# Patient Record
Sex: MALE | Race: WHITE | NOT HISPANIC OR LATINO | Employment: UNEMPLOYED | URBAN - METROPOLITAN AREA
[De-identification: names, ages, dates, MRNs, and addresses within clinical notes are randomized per-mention and may not be internally consistent; named-entity substitution may affect disease eponyms.]

---

## 2018-03-28 ENCOUNTER — CONVERSION ENCOUNTER (OUTPATIENT)
Dept: OTHER | Facility: HOSPITAL | Age: 48
End: 2018-03-28

## 2018-04-04 ENCOUNTER — CONVERSION ENCOUNTER (OUTPATIENT)
Dept: OTHER | Facility: HOSPITAL | Age: 48
End: 2018-04-04

## 2018-05-08 ENCOUNTER — HOSPITAL ENCOUNTER (OUTPATIENT)
Dept: FAMILY MEDICINE CLINIC | Facility: CLINIC | Age: 48
Setting detail: SPECIMEN
Discharge: HOME | End: 2018-05-08
Attending: FAMILY MEDICINE | Admitting: FAMILY MEDICINE

## 2018-05-08 ENCOUNTER — CONVERSION ENCOUNTER (OUTPATIENT)
Dept: OTHER | Facility: HOSPITAL | Age: 48
End: 2018-05-08

## 2018-05-08 LAB
ALBUMIN SERPL-MCNC: 4.3 G/DL (ref 3.5–4.8)
ALBUMIN/GLOB SERPL: 1.2 {RATIO} (ref 1–1.7)
ALP SERPL-CCNC: 77 IU/L (ref 32–91)
ALT SERPL-CCNC: 34 IU/L (ref 17–63)
ANION GAP SERPL CALC-SCNC: 16.7 MMOL/L (ref 10–20)
AST SERPL-CCNC: 30 IU/L (ref 15–41)
BASOPHILS # BLD AUTO: 0.1 10*3/UL (ref 0–0.2)
BASOPHILS NFR BLD AUTO: 1 % (ref 0–2)
BILIRUB SERPL-MCNC: 0.9 MG/DL (ref 0.3–1.2)
BUN SERPL-MCNC: 29 MG/DL (ref 8–20)
BUN/CREAT SERPL: 16.1 (ref 6.2–20.3)
CALCIUM SERPL-MCNC: 9.4 MG/DL (ref 8.9–10.3)
CHLORIDE SERPL-SCNC: 97 MMOL/L (ref 101–111)
CONV CO2: 24 MMOL/L (ref 22–32)
CONV TOTAL PROTEIN: 7.9 G/DL (ref 6.1–7.9)
CREAT UR-MCNC: 1.8 MG/DL (ref 0.7–1.2)
DIFFERENTIAL METHOD BLD: (no result)
EOSINOPHIL # BLD AUTO: 0.1 10*3/UL (ref 0–0.3)
EOSINOPHIL # BLD AUTO: 1 % (ref 0–3)
ERYTHROCYTE [DISTWIDTH] IN BLOOD BY AUTOMATED COUNT: 13.7 % (ref 11.5–14.5)
GLOBULIN UR ELPH-MCNC: 3.6 G/DL (ref 2.5–3.8)
GLUCOSE SERPL-MCNC: 215 MG/DL (ref 65–99)
HCT VFR BLD AUTO: 44.2 % (ref 40–54)
HGB BLD-MCNC: 14.9 G/DL (ref 14–18)
LYMPHOCYTES # BLD AUTO: 2.6 10*3/UL (ref 0.8–4.8)
LYMPHOCYTES NFR BLD AUTO: 25 % (ref 18–42)
MCH RBC QN AUTO: 29.2 PG (ref 26–32)
MCHC RBC AUTO-ENTMCNC: 33.8 G/DL (ref 32–36)
MCV RBC AUTO: 86.6 FL (ref 80–94)
MONOCYTES # BLD AUTO: 0.9 10*3/UL (ref 0.1–1.3)
MONOCYTES NFR BLD AUTO: 9 % (ref 2–11)
NEUTROPHILS # BLD AUTO: 6.8 10*3/UL (ref 2.3–8.6)
NEUTROPHILS NFR BLD AUTO: 64 % (ref 50–75)
NRBC BLD AUTO-RTO: 0 /100{WBCS}
NRBC/RBC NFR BLD MANUAL: 0 10*3/UL
PLATELET # BLD AUTO: 253 10*3/UL (ref 150–450)
PMV BLD AUTO: 10.5 FL (ref 7.4–10.4)
POTASSIUM SERPL-SCNC: 4.7 MMOL/L (ref 3.6–5.1)
RBC # BLD AUTO: 5.1 10*6/UL (ref 4.6–6)
SODIUM SERPL-SCNC: 133 MMOL/L (ref 136–144)
URATE SERPL-MCNC: 8.9 MG/DL (ref 4.8–8.7)
WBC # BLD AUTO: 10.5 10*3/UL (ref 4.5–11.5)

## 2018-06-06 ENCOUNTER — HOSPITAL ENCOUNTER (OUTPATIENT)
Dept: FAMILY MEDICINE CLINIC | Facility: CLINIC | Age: 48
Setting detail: SPECIMEN
Discharge: HOME | End: 2018-06-06
Attending: FAMILY MEDICINE | Admitting: FAMILY MEDICINE

## 2018-06-06 ENCOUNTER — CONVERSION ENCOUNTER (OUTPATIENT)
Dept: OTHER | Facility: HOSPITAL | Age: 48
End: 2018-06-06

## 2018-06-06 LAB
ANION GAP SERPL CALC-SCNC: 13.5 MMOL/L (ref 10–20)
BUN SERPL-MCNC: 30 MG/DL (ref 8–20)
BUN/CREAT SERPL: 16.7 (ref 6.2–20.3)
CALCIUM SERPL-MCNC: 9.4 MG/DL (ref 8.9–10.3)
CHLORIDE SERPL-SCNC: 103 MMOL/L (ref 101–111)
CONV CO2: 25 MMOL/L (ref 22–32)
CREAT UR-MCNC: 1.8 MG/DL (ref 0.7–1.2)
GLUCOSE SERPL-MCNC: 215 MG/DL (ref 65–99)
POTASSIUM SERPL-SCNC: 4.5 MMOL/L (ref 3.6–5.1)
SODIUM SERPL-SCNC: 137 MMOL/L (ref 136–144)
URATE SERPL-MCNC: 9.2 MG/DL (ref 4.8–8.7)

## 2018-08-07 ENCOUNTER — HOSPITAL ENCOUNTER (OUTPATIENT)
Dept: FAMILY MEDICINE CLINIC | Facility: CLINIC | Age: 48
Setting detail: SPECIMEN
Discharge: HOME | End: 2018-08-07
Attending: FAMILY MEDICINE | Admitting: FAMILY MEDICINE

## 2018-08-07 ENCOUNTER — CONVERSION ENCOUNTER (OUTPATIENT)
Dept: OTHER | Facility: HOSPITAL | Age: 48
End: 2018-08-07

## 2018-08-07 LAB
ANION GAP SERPL CALC-SCNC: 13.1 MMOL/L (ref 10–20)
BUN SERPL-MCNC: 29 MG/DL (ref 8–20)
BUN/CREAT SERPL: 16.1 (ref 6.2–20.3)
CALCIUM SERPL-MCNC: 9.3 MG/DL (ref 8.9–10.3)
CHLORIDE SERPL-SCNC: 103 MMOL/L (ref 101–111)
CONV CO2: 23 MMOL/L (ref 22–32)
CREAT UR-MCNC: 1.8 MG/DL (ref 0.7–1.2)
GLUCOSE SERPL-MCNC: 158 MG/DL (ref 65–99)
POTASSIUM SERPL-SCNC: 4.1 MMOL/L (ref 3.6–5.1)
SODIUM SERPL-SCNC: 135 MMOL/L (ref 136–144)
URATE SERPL-MCNC: 9.5 MG/DL (ref 4.8–8.7)

## 2018-11-06 ENCOUNTER — CONVERSION ENCOUNTER (OUTPATIENT)
Dept: OTHER | Facility: HOSPITAL | Age: 48
End: 2018-11-06

## 2018-11-06 ENCOUNTER — HOSPITAL ENCOUNTER (OUTPATIENT)
Dept: FAMILY MEDICINE CLINIC | Facility: CLINIC | Age: 48
Setting detail: SPECIMEN
Discharge: HOME | End: 2018-11-06
Attending: FAMILY MEDICINE | Admitting: FAMILY MEDICINE

## 2018-11-06 LAB
ALBUMIN SERPL-MCNC: 4.5 G/DL (ref 3.5–4.8)
ALBUMIN/GLOB SERPL: 1.3 {RATIO} (ref 1–1.7)
ALP SERPL-CCNC: 65 IU/L (ref 32–91)
ALT SERPL-CCNC: 52 IU/L (ref 17–63)
ANION GAP SERPL CALC-SCNC: 17.5 MMOL/L (ref 10–20)
AST SERPL-CCNC: 49 IU/L (ref 15–41)
BASOPHILS # BLD AUTO: 0.1 10*3/UL (ref 0–0.2)
BASOPHILS NFR BLD AUTO: 1 % (ref 0–2)
BILIRUB SERPL-MCNC: 1.2 MG/DL (ref 0.3–1.2)
BUN SERPL-MCNC: 24 MG/DL (ref 8–20)
BUN/CREAT SERPL: 15 (ref 6.2–20.3)
CALCIUM SERPL-MCNC: 9.4 MG/DL (ref 8.9–10.3)
CHLORIDE SERPL-SCNC: 103 MMOL/L (ref 101–111)
CHOLEST SERPL-MCNC: 187 MG/DL
CHOLEST/HDLC SERPL: 5.5 {RATIO}
CONV CO2: 23 MMOL/L (ref 22–32)
CONV LDL CHOLESTEROL DIRECT: 76 MG/DL (ref 0–100)
CONV TOTAL PROTEIN: 8 G/DL (ref 6.1–7.9)
CREAT UR-MCNC: 1.6 MG/DL (ref 0.7–1.2)
DIFFERENTIAL METHOD BLD: (no result)
EOSINOPHIL # BLD AUTO: 0.2 10*3/UL (ref 0–0.3)
EOSINOPHIL # BLD AUTO: 2 % (ref 0–3)
ERYTHROCYTE [DISTWIDTH] IN BLOOD BY AUTOMATED COUNT: 13.4 % (ref 11.5–14.5)
GLOBULIN UR ELPH-MCNC: 3.5 G/DL (ref 2.5–3.8)
GLUCOSE SERPL-MCNC: 133 MG/DL (ref 65–99)
HCT VFR BLD AUTO: 44.7 % (ref 40–54)
HDLC SERPL-MCNC: 34 MG/DL
HGB BLD-MCNC: 15.4 G/DL (ref 14–18)
LDLC/HDLC SERPL: 2.2 {RATIO}
LIPID INTERPRETATION: ABNORMAL
LYMPHOCYTES # BLD AUTO: 2.5 10*3/UL (ref 0.8–4.8)
LYMPHOCYTES NFR BLD AUTO: 35 % (ref 18–42)
MCH RBC QN AUTO: 29.9 PG (ref 26–32)
MCHC RBC AUTO-ENTMCNC: 34.4 G/DL (ref 32–36)
MCV RBC AUTO: 86.8 FL (ref 80–94)
MONOCYTES # BLD AUTO: 0.8 10*3/UL (ref 0.1–1.3)
MONOCYTES NFR BLD AUTO: 12 % (ref 2–11)
NEUTROPHILS # BLD AUTO: 3.5 10*3/UL (ref 2.3–8.6)
NEUTROPHILS NFR BLD AUTO: 50 % (ref 50–75)
NRBC BLD AUTO-RTO: 0 /100{WBCS}
NRBC/RBC NFR BLD MANUAL: 0 10*3/UL
PLATELET # BLD AUTO: 205 10*3/UL (ref 150–450)
PMV BLD AUTO: 10.5 FL (ref 7.4–10.4)
POTASSIUM SERPL-SCNC: 4.5 MMOL/L (ref 3.6–5.1)
RBC # BLD AUTO: 5.15 10*6/UL (ref 4.6–6)
SODIUM SERPL-SCNC: 139 MMOL/L (ref 136–144)
TRIGL SERPL-MCNC: 433 MG/DL
URATE SERPL-MCNC: 9.3 MG/DL (ref 4.8–8.7)
VLDLC SERPL CALC-MCNC: 77.1 MG/DL
WBC # BLD AUTO: 7 10*3/UL (ref 4.5–11.5)

## 2018-11-30 ENCOUNTER — CONVERSION ENCOUNTER (OUTPATIENT)
Dept: OTHER | Facility: HOSPITAL | Age: 48
End: 2018-11-30

## 2018-12-05 ENCOUNTER — CONVERSION ENCOUNTER (OUTPATIENT)
Dept: OTHER | Facility: HOSPITAL | Age: 48
End: 2018-12-05

## 2018-12-05 ENCOUNTER — HOSPITAL ENCOUNTER (OUTPATIENT)
Dept: FAMILY MEDICINE CLINIC | Facility: CLINIC | Age: 48
Setting detail: SPECIMEN
Discharge: HOME | End: 2018-12-05
Attending: FAMILY MEDICINE | Admitting: FAMILY MEDICINE

## 2018-12-05 LAB — URATE SERPL-MCNC: 8.4 MG/DL (ref 4.8–8.7)

## 2019-02-06 ENCOUNTER — CONVERSION ENCOUNTER (OUTPATIENT)
Dept: OTHER | Facility: HOSPITAL | Age: 49
End: 2019-02-06

## 2019-02-06 ENCOUNTER — HOSPITAL ENCOUNTER (OUTPATIENT)
Dept: FAMILY MEDICINE CLINIC | Facility: CLINIC | Age: 49
Setting detail: SPECIMEN
Discharge: HOME | End: 2019-02-06
Attending: FAMILY MEDICINE | Admitting: FAMILY MEDICINE

## 2019-02-06 LAB
ALBUMIN SERPL-MCNC: 4.1 G/DL (ref 3.5–4.8)
ALBUMIN/GLOB SERPL: 1.2 {RATIO} (ref 1–1.7)
ALP SERPL-CCNC: 81 IU/L (ref 32–91)
ALT SERPL-CCNC: 69 IU/L (ref 17–63)
ANION GAP SERPL CALC-SCNC: 15 MMOL/L (ref 10–20)
AST SERPL-CCNC: 61 IU/L (ref 15–41)
BILIRUB SERPL-MCNC: 0.9 MG/DL (ref 0.3–1.2)
BUN SERPL-MCNC: 22 MG/DL (ref 8–20)
BUN/CREAT SERPL: 12.9 (ref 6.2–20.3)
CALCIUM SERPL-MCNC: 9.2 MG/DL (ref 8.9–10.3)
CHLORIDE SERPL-SCNC: 101 MMOL/L (ref 101–111)
CONV CO2: 23 MMOL/L (ref 22–32)
CONV TOTAL PROTEIN: 7.6 G/DL (ref 6.1–7.9)
CREAT UR-MCNC: 1.7 MG/DL (ref 0.7–1.2)
GLOBULIN UR ELPH-MCNC: 3.5 G/DL (ref 2.5–3.8)
GLUCOSE SERPL-MCNC: 208 MG/DL (ref 65–99)
POTASSIUM SERPL-SCNC: 4 MMOL/L (ref 3.6–5.1)
SODIUM SERPL-SCNC: 135 MMOL/L (ref 136–144)
URATE SERPL-MCNC: 9.1 MG/DL (ref 4.8–8.7)

## 2019-03-20 ENCOUNTER — CONVERSION ENCOUNTER (OUTPATIENT)
Dept: OTHER | Facility: HOSPITAL | Age: 49
End: 2019-03-20

## 2019-03-27 ENCOUNTER — CONVERSION ENCOUNTER (OUTPATIENT)
Dept: OTHER | Facility: HOSPITAL | Age: 49
End: 2019-03-27

## 2019-04-24 ENCOUNTER — CONVERSION ENCOUNTER (OUTPATIENT)
Dept: OTHER | Facility: HOSPITAL | Age: 49
End: 2019-04-24

## 2019-05-21 ENCOUNTER — CONVERSION ENCOUNTER (OUTPATIENT)
Dept: OTHER | Facility: HOSPITAL | Age: 49
End: 2019-05-21

## 2019-06-04 VITALS
OXYGEN SATURATION: 99 % | BODY MASS INDEX: 40.84 KG/M2 | RESPIRATION RATE: 20 BRPM | HEART RATE: 72 BPM | OXYGEN SATURATION: 99 % | WEIGHT: 258.4 LBS | BODY MASS INDEX: 42.35 KG/M2 | RESPIRATION RATE: 22 BRPM | SYSTOLIC BLOOD PRESSURE: 153 MMHG | HEIGHT: 67 IN | HEIGHT: 67 IN | RESPIRATION RATE: 18 BRPM | OXYGEN SATURATION: 98 % | SYSTOLIC BLOOD PRESSURE: 129 MMHG | HEART RATE: 86 BPM | SYSTOLIC BLOOD PRESSURE: 138 MMHG | RESPIRATION RATE: 18 BRPM | WEIGHT: 254 LBS | DIASTOLIC BLOOD PRESSURE: 84 MMHG | OXYGEN SATURATION: 99 % | WEIGHT: 260.8 LBS | HEART RATE: 90 BPM | OXYGEN SATURATION: 98 % | HEIGHT: 67 IN | HEIGHT: 67 IN | BODY MASS INDEX: 39.71 KG/M2 | HEART RATE: 78 BPM | RESPIRATION RATE: 18 BRPM | HEART RATE: 83 BPM | OXYGEN SATURATION: 99 % | BODY MASS INDEX: 40.81 KG/M2 | DIASTOLIC BLOOD PRESSURE: 77 MMHG | BODY MASS INDEX: 40.87 KG/M2 | DIASTOLIC BLOOD PRESSURE: 83 MMHG | HEART RATE: 73 BPM | BODY MASS INDEX: 40.56 KG/M2 | BODY MASS INDEX: 41.03 KG/M2 | HEART RATE: 80 BPM | HEIGHT: 67 IN | SYSTOLIC BLOOD PRESSURE: 157 MMHG | DIASTOLIC BLOOD PRESSURE: 86 MMHG | RESPIRATION RATE: 18 BRPM | BODY MASS INDEX: 41.21 KG/M2 | RESPIRATION RATE: 20 BRPM | DIASTOLIC BLOOD PRESSURE: 87 MMHG | DIASTOLIC BLOOD PRESSURE: 88 MMHG | BODY MASS INDEX: 40.18 KG/M2 | RESPIRATION RATE: 20 BRPM | SYSTOLIC BLOOD PRESSURE: 153 MMHG | WEIGHT: 262.6 LBS | DIASTOLIC BLOOD PRESSURE: 83 MMHG | OXYGEN SATURATION: 99 % | OXYGEN SATURATION: 98 % | HEIGHT: 67 IN | BODY MASS INDEX: 39.87 KG/M2 | OXYGEN SATURATION: 97 % | HEIGHT: 67 IN | HEART RATE: 79 BPM | HEART RATE: 78 BPM | DIASTOLIC BLOOD PRESSURE: 75 MMHG | WEIGHT: 260 LBS | HEIGHT: 67 IN | WEIGHT: 259.4 LBS | WEIGHT: 260.4 LBS | WEIGHT: 261.4 LBS | DIASTOLIC BLOOD PRESSURE: 76 MMHG | HEIGHT: 67 IN | BODY MASS INDEX: 40.93 KG/M2 | SYSTOLIC BLOOD PRESSURE: 160 MMHG | HEIGHT: 67 IN | DIASTOLIC BLOOD PRESSURE: 96 MMHG | SYSTOLIC BLOOD PRESSURE: 163 MMHG | OXYGEN SATURATION: 99 % | HEIGHT: 67 IN | WEIGHT: 253 LBS | HEIGHT: 67 IN | OXYGEN SATURATION: 99 % | SYSTOLIC BLOOD PRESSURE: 144 MMHG | HEART RATE: 93 BPM | WEIGHT: 260.2 LBS | BODY MASS INDEX: 40.71 KG/M2 | WEIGHT: 269.8 LBS | OXYGEN SATURATION: 99 % | HEART RATE: 77 BPM | SYSTOLIC BLOOD PRESSURE: 132 MMHG | DIASTOLIC BLOOD PRESSURE: 78 MMHG | SYSTOLIC BLOOD PRESSURE: 146 MMHG | WEIGHT: 256 LBS | SYSTOLIC BLOOD PRESSURE: 146 MMHG | SYSTOLIC BLOOD PRESSURE: 140 MMHG | DIASTOLIC BLOOD PRESSURE: 84 MMHG

## 2019-06-04 VITALS
OXYGEN SATURATION: 97 % | HEART RATE: 84 BPM | BODY MASS INDEX: 39.99 KG/M2 | WEIGHT: 254.8 LBS | SYSTOLIC BLOOD PRESSURE: 140 MMHG | DIASTOLIC BLOOD PRESSURE: 83 MMHG | HEIGHT: 67 IN

## 2019-06-25 RX ORDER — INDAPAMIDE 2.5 MG/1
2.5 TABLET, FILM COATED ORAL DAILY
Qty: 90 TABLET | Refills: 0 | Status: SHIPPED | OUTPATIENT
Start: 2019-06-25 | End: 2019-08-27 | Stop reason: SINTOL

## 2019-06-25 RX ORDER — ALBUTEROL SULFATE 90 UG/1
2 AEROSOL, METERED RESPIRATORY (INHALATION) 3 TIMES DAILY PRN
Qty: 1 INHALER | Refills: 1 | Status: SHIPPED | OUTPATIENT
Start: 2019-06-25 | End: 2019-08-13 | Stop reason: SDUPTHER

## 2019-06-25 RX ORDER — COLCHICINE 0.6 MG/1
1 TABLET ORAL 2 TIMES DAILY
COMMUNITY
Start: 2018-05-09 | End: 2019-06-25 | Stop reason: SDUPTHER

## 2019-06-25 RX ORDER — AMLODIPINE BESYLATE 10 MG/1
10 TABLET ORAL DAILY
Qty: 90 TABLET | Refills: 0 | Status: SHIPPED | OUTPATIENT
Start: 2019-06-25 | End: 2019-12-23

## 2019-06-25 RX ORDER — COLCHICINE 0.6 MG/1
0.6 TABLET ORAL 2 TIMES DAILY
Qty: 180 TABLET | Refills: 0 | Status: SHIPPED | OUTPATIENT
Start: 2019-06-25 | End: 2019-08-20 | Stop reason: SINTOL

## 2019-06-25 RX ORDER — ASPIRIN 81 MG/1
1 TABLET ORAL DAILY
COMMUNITY
Start: 2015-01-15

## 2019-06-25 RX ORDER — HYDRALAZINE HYDROCHLORIDE 100 MG/1
100 TABLET, FILM COATED ORAL EVERY 8 HOURS
Qty: 270 TABLET | Refills: 0 | Status: SHIPPED | OUTPATIENT
Start: 2019-06-25 | End: 2020-03-11 | Stop reason: SDUPTHER

## 2019-06-25 RX ORDER — INDAPAMIDE 2.5 MG/1
1 TABLET, FILM COATED ORAL DAILY
COMMUNITY
Start: 2018-02-05 | End: 2019-06-25 | Stop reason: SDUPTHER

## 2019-06-25 RX ORDER — ROSUVASTATIN CALCIUM 10 MG/1
10 TABLET, COATED ORAL NIGHTLY
Qty: 90 TABLET | Refills: 0 | Status: SHIPPED | OUTPATIENT
Start: 2019-06-25 | End: 2020-04-28 | Stop reason: SDUPTHER

## 2019-06-25 RX ORDER — ALLOPURINOL 300 MG/1
300 TABLET ORAL DAILY
Qty: 90 TABLET | Refills: 0 | Status: SHIPPED | OUTPATIENT
Start: 2019-06-25 | End: 2019-07-26 | Stop reason: SDUPTHER

## 2019-06-25 RX ORDER — PEN NEEDLE, DIABETIC 30 GX3/16"
1 NEEDLE, DISPOSABLE MISCELLANEOUS 4 TIMES DAILY
COMMUNITY
Start: 2018-07-31 | End: 2019-06-25 | Stop reason: SDUPTHER

## 2019-06-25 RX ORDER — CALCIUM CARB/VITAMIN D3/VIT K1 500-100-40
1 TABLET,CHEWABLE ORAL DAILY
COMMUNITY
Start: 2018-04-09 | End: 2019-06-25 | Stop reason: SDUPTHER

## 2019-06-25 RX ORDER — AMLODIPINE BESYLATE 10 MG/1
1 TABLET ORAL DAILY
COMMUNITY
Start: 2018-06-23 | End: 2019-06-25 | Stop reason: SDUPTHER

## 2019-06-25 RX ORDER — PEN NEEDLE, DIABETIC 30 GX3/16"
1 NEEDLE, DISPOSABLE MISCELLANEOUS 4 TIMES DAILY
Qty: 100 EACH | Refills: 2 | Status: SHIPPED | OUTPATIENT
Start: 2019-06-25 | End: 2020-09-09 | Stop reason: SDUPTHER

## 2019-06-25 RX ORDER — HYDRALAZINE HYDROCHLORIDE 100 MG/1
1 TABLET, FILM COATED ORAL EVERY 8 HOURS
COMMUNITY
Start: 2018-07-02 | End: 2019-06-25 | Stop reason: SDUPTHER

## 2019-06-25 RX ORDER — CALCIUM CARB/VITAMIN D3/VIT K1 500-100-40
1 TABLET,CHEWABLE ORAL DAILY
Qty: 90 EACH | Refills: 0 | Status: SHIPPED | OUTPATIENT
Start: 2019-06-25

## 2019-06-25 RX ORDER — LOSARTAN POTASSIUM 100 MG/1
100 TABLET ORAL DAILY
Qty: 90 TABLET | Refills: 0 | Status: SHIPPED | OUTPATIENT
Start: 2019-06-25 | End: 2019-08-27 | Stop reason: SINTOL

## 2019-06-25 RX ORDER — ALBUTEROL SULFATE 90 UG/1
2 AEROSOL, METERED RESPIRATORY (INHALATION) 3 TIMES DAILY PRN
COMMUNITY
Start: 2019-05-21 | End: 2019-06-25 | Stop reason: SDUPTHER

## 2019-06-25 RX ORDER — CLONIDINE HYDROCHLORIDE 0.1 MG/1
0.1 TABLET ORAL EVERY 12 HOURS
Qty: 180 TABLET | Refills: 0 | Status: SHIPPED | OUTPATIENT
Start: 2019-06-25 | End: 2019-08-20 | Stop reason: SDUPTHER

## 2019-06-25 RX ORDER — ROSUVASTATIN CALCIUM 10 MG/1
1 TABLET, COATED ORAL NIGHTLY
COMMUNITY
Start: 2018-07-01 | End: 2019-06-25 | Stop reason: SDUPTHER

## 2019-06-25 RX ORDER — CLONIDINE HYDROCHLORIDE 0.1 MG/1
1 TABLET ORAL EVERY 12 HOURS
COMMUNITY
Start: 2019-03-27 | End: 2019-06-25 | Stop reason: SDUPTHER

## 2019-06-25 RX ORDER — ALLOPURINOL 300 MG/1
1 TABLET ORAL DAILY
COMMUNITY
Start: 2019-02-19 | End: 2019-06-25 | Stop reason: SDUPTHER

## 2019-06-25 RX ORDER — LOSARTAN POTASSIUM 100 MG/1
1 TABLET ORAL DAILY
COMMUNITY
Start: 2018-06-06 | End: 2019-06-25 | Stop reason: SDUPTHER

## 2019-06-28 NOTE — PROGRESS NOTES
Visit Type:  Acute Visit  Referring Provider:  Barbi Vega MD  Primary Provider:  Gary LEON, Barbi MCLEOD    CC:  head cold.    History of Present Illness:  Patient presents with head congestion and cough x 1 week, he had a sore throat the first few days.  He had a fever yesterday.  Pt is daibetic, on insulin  lives with elederly parents.  His mother is being treated for cancer      Vital Signs:    Patient Profile:    48 Years Old Male  Height:     67 inches (170.18 cm)  Weight:     254.8 pounds  BMI:        39.90     O2 Sat:     97 %  Temp:       98.4 degrees F oral  Pulse rate: 84 / minute  BP Sittin / 83  (left arm)    Cuff size:  regular          Vitals Entered By: Nayeli Enciso CMA (May 21, 2019 11:31 AM)    Active Medications:  BASAGLAR 100 UNIT/ML KWIKPEN (INSULIN GLARGINE) INJECT 35 UNITS UNDER THE SKIN EVERY DAY  CLONIDINE HCL 0.1 MG ORAL TABLET (CLONIDINE HCL) 1 po BID  OZEMPIC 0.25 OR 0.5 MG/DOSE SUBCUTANEOUS SOLUTION PEN-INJECTOR (SEMAGLUTIDE) 0.50 mg sq q week  TRADJENTA 5 MG TABLET (LINAGLIPTIN) TAKE 1 TABLET BY MOUTH EVERY DAY  ALLOPURINOL 300 MG ORAL TABLET (ALLOPURINOL) take 1 tablet daily  DICLOFENAC SODIUM 1 % TRANSDERMAL GEL (DICLOFENAC SODIUM) apply to ankle 3x a day  COLCHICINE 0.6 MG TABS (COLCHICINE) TAKE 1 TABLET BY MOUTH TWICE A DAY  LOSARTAN POTASSIUM 100 MG TAB (LOSARTAN POTASSIUM) TAKE ONE TABLET BY MOUTH DAILY  AMLODIPINE BESYLATE 10 MG TAB (AMLODIPINE BESYLATE) TAKE 1 TABLET BY MOUTH EVERY DAY  ROSUVASTATIN CALCIUM 10 MG TAB (ROSUVASTATIN CALCIUM) TAKE 1 TABLET BY MOUTH EVERY DAY  INDAPAMIDE 2.5 MG ORAL TABLET (INDAPAMIDE) TAKE 1 TABLET BY MOUTH DAILY  HYDRALAZINE 100 MG TABLET (HYDRALAZINE HCL) TAKE 1 TABLET BY MOUTH 3 TIMES A DAY  TIMOLOL MALEATE 0.5 % OPHTHALMIC SOLUTION (TIMOLOL MALEATE) INSTILL 1 DROP INTO AFFECTED EYE(S) EVERY MORNING  LATANOPROST 0.005 % OPHTHALMIC SOLUTION (LATANOPROST) INSTILL 1 DROP INTO BOTH EYES EVERY EVENING  ADULT ASPIRIN EC LOW STRENGTH 81 MG ORAL  "TABLET DELAYED RELEASE (ASPIRIN) Take 1 tablet by mouth daily  INSULIN SYRINGE 31G X 5/16\" 1 ML (INSULIN SYRINGE-NEEDLE U-100) Use 1 syringe qd with basaglar injection DX:E11.9  BLOOD PRESS MONITOR/M-L CUFF (BLOOD PRESSURE MONITORING) monitor bp daily & record  ULTICARE PEN NEEDLES 4MM 32G (INSULIN PEN NEEDLE) USE 1 PEN NEEDLE WITH EACH INSULIN INJECTION  DX:E11.9  TRUETRACK TEST IN VITRO STRIP (GLUCOSE BLOOD) Check blood sugar qd to BID  DX: E11.9    Current Allergies:  No known allergies      Past Medical History:     Reviewed history from 05/08/2018 and no changes required:        Diabetes, Type 2        Heart disease, hx MI        Hypertension        Gout        hearing loss        Hyperlipidemia                             Past Surgical History:     Reviewed history from 11/12/2015 and no changes required:        December 2014 [facility] for heart stent         Cataract Extraction  bilateral May 2015                Social History:     Reviewed history from 11/30/2018 and no changes required:        Passive Smoke: N        Alcohol Use: N        Drug Use: N        HIV/High Risk: N        Regular Exercise: Y        Hx Domestic Abuse: N        Restoration Affecting Care: N                Smoking History:        Patient has never smoked.        Risk Factors:     Smoked Tobacco Use:  Never smoker  Smokeless Tobacco Use:  Never  Passive smoke exposure:  no  Drug use:  no  HIV high-risk behavior:  no  Caffeine use:  2 drinks per day  Alcohol use:  no  Exercise:  yes     Times per week:  2     Type of Exercise:  walking  Seatbelt use:  100 %  Sun Exposure:  occasionally    Family History Risk Factors:     Family History of MI in females < 65 years old:  no     Family History of MI in males < 55 years old:  no    Dietary Counseling: yes    Previous Tobacco Use: Signed On - 04/24/2019  Smoked Tobacco Use:  Never smoker  Smokeless Tobacco Use:  Never  Passive smoke exposure:  no  Drug use:  no  HIV high-risk behavior:  " no  Caffeine use:  2 drinks per day    Previous Alcohol Use: Signed On - 04/24/2019  Alcohol use:  no  Exercise:  yes     Times per week:  2     Type of Exercise:  walking  Seatbelt use:  100 %  Sun Exposure:  occasionally    Family History Risk Factors:     Family History of MI in females < 65 years old:  no     Family History of MI in males < 55 years old:  no    Dietary Counseling: yes        Review of Systems        See HPI      Physical Exam    General:      obese.    Head:      Frontal Sinus Tenderness R.    Eyes:      PERRL/EOM intact, conjunctiva and sclera clear with out nystagmus.    Ears:      TM's intact and clear with normal canals with grossly normal hearing.    Nose:      turbinates swollen.    Mouth:      throat injected.    Neck:      no masses, thyromegaly, or abnormal cervical nodes.    Lungs:      decreased BS on L and wheezes on L.        Blood Pressure:  Today's BP: 140/83 mm Hg    Labwork:   Most Recent Lab Results:   LDL: 76 mg/dL 11/06/2018  HbA1c: : 8.7 % 04/04/2019  Microalbumin: 798.3 mg/L 02/09/2015      Impression & Recommendations:    Problem # 1:  Sore throat (ICD-462) (ATI41-T77.9)    His updated medication list for this problem includes:     Cefdinir 300 Mg Oral Capsule (Cefdinir) ..... Take 1 capsule 2x a day     Adult Aspirin Ec Low Strength 81 Mg Oral Tablet Delayed Release (Aspirin) ..... Take 1 tablet by mouth daily    Orders:  Rapid Strep  ([id number])      Problem # 2:  Persistent cough (ICD-786.2) ([id number])    Problem # 3:  Diabetes, Type 2 (ICD-250.00) (JYY53-B11.9)    His updated medication list for this problem includes:     Basaglar 100 Unit/ml Kwikpen (Insulin glargine) ..... Inject 35 units under the skin every day     Ozempic 0.25 or 0.5 Mg/dose Subcutaneous Solution Pen-injector (Semaglutide) ..... 0.50 mg sq q week     Tradjenta 5 Mg Tablet (Linagliptin) ..... Take 1 tablet by mouth every day     Losartan Potassium 100 Mg Tab (Losartan potassium) ..... Take one tablet  by mouth daily     Adult Aspirin Ec Low Strength 81 Mg Oral Tablet Delayed Release (Aspirin) ..... Take 1 tablet by mouth daily      Problem # 4:  Chronic kidney disease, Stage II (mild) (ICD-585.2) (IDR70-U03.2)    Medications Added to Medication List This Visit:  1)  Albuterol Sulfate Hfa 108 (90 Base) Mcg/act Inhalation Aerosol Solution (Albuterol sulfate) .... 2 puffs 3x a day as needed  2)  Tessalon Perles 100 Mg Oral Capsule (Benzonatate) .... Take 1 capsule 4x a day as needed for cough  3)  Cefdinir 300 Mg Oral Capsule (Cefdinir) .... Take 1 capsule 2x a day        Patient Instructions:  1)  fu cxr  2)  Rx cefdinir  3)  Rx tessalon  4)  keep fu appt    ]  Technician: Nayeli Enciso CMA               Date/Time Collected: May 21, 2019 12:01 PM)  Date/Time Received: May 21, 2019 12:01 PM)  Rapid Strep, Group A:    Presumptive Negative       Negative (normal range)        Electronically signed by Barbi Vega MD on 05/21/2019 at 12:10 PM  ________________________________________________________________________                   Allergies:   No Known Allergies        Labwork:   Most Recent Lab Results:   LDL: 76 mg/dL 11/06/2018  HbA1c: : 8.7 % 04/04/2019  Microalbumin: 798.3 mg/L 02/09/2015                              Medication Administration        Electronically signed by Barbi Vega MD on 06/27/2019 at 8:11 PM  ________________________________________________________________________       Disclaimer: Converted Note message may not contain all data elements that existed in the [employer] source system. Please see [employer] for the original note details.

## 2019-07-05 DIAGNOSIS — E13.9 DIABETES 1.5, MANAGED AS TYPE 2 (HCC): Primary | ICD-10-CM

## 2019-07-10 ENCOUNTER — OFFICE VISIT (OUTPATIENT)
Dept: FAMILY MEDICINE CLINIC | Facility: CLINIC | Age: 49
End: 2019-07-10

## 2019-07-10 VITALS
HEART RATE: 88 BPM | SYSTOLIC BLOOD PRESSURE: 163 MMHG | DIASTOLIC BLOOD PRESSURE: 91 MMHG | TEMPERATURE: 97.6 F | BODY MASS INDEX: 39.94 KG/M2 | OXYGEN SATURATION: 99 % | WEIGHT: 255 LBS | RESPIRATION RATE: 17 BRPM

## 2019-07-10 DIAGNOSIS — M25.511 ACUTE PAIN OF RIGHT SHOULDER: Primary | ICD-10-CM

## 2019-07-10 PROCEDURE — 99213 OFFICE O/P EST LOW 20 MIN: CPT | Performed by: FAMILY MEDICINE

## 2019-07-10 NOTE — PROGRESS NOTES
Subjective   Sam Prakash is a 48 y.o. male.     No problems updated.  History of Present Illness     Pt fell last week. He tripped on asphalt and landed on right shoulder.  Now c/o pain, limited movement.  The following portions of the patient's history were reviewed and updated as appropriate: allergies, current medications, past family history, past medical history, past social history, past surgical history and problem list.    Past Medical History:   Diagnosis Date   • Gout    • Hearing loss    • Heart disease    • History of MI (myocardial infarction)    • History of type 2 diabetes mellitus    • Hyperlipidemia    • Hypertension        Past Surgical History:   Procedure Laterality Date   • CATARACT EXTRACTION Bilateral 05/2015   • OTHER SURGICAL HISTORY  12/2014    [facility] HEART STENT       Family History   Problem Relation Age of Onset   • Heart disease Father    • Other Father         HEART ATTACK IN 2008   • Arthritis Maternal Grandmother    • Diabetes Maternal Grandfather    • Heart disease Maternal Grandfather    • Hypertension Maternal Grandfather    • Diabetes Paternal Grandfather    • Hyperlipidemia Paternal Grandfather    • Hypertension Paternal Grandfather        Review of Systems   Constitutional: Negative for fatigue, unexpected weight gain and unexpected weight loss.   HENT: Negative for congestion, sinus pressure and sore throat.    Eyes: Negative for blurred vision and visual disturbance.   Respiratory: Negative for cough, shortness of breath and wheezing.    Cardiovascular: Negative for chest pain, palpitations and leg swelling.   Gastrointestinal: Negative for abdominal pain, constipation, diarrhea, nausea, vomiting, GERD and indigestion.   Musculoskeletal: Negative for arthralgias, back pain, gait problem, joint swelling and neck pain.        Right shoulder pain   Skin: Negative for rash, skin lesions and bruise.   Allergic/Immunologic: Negative for environmental allergies.    Neurological: Negative for dizziness, tremors, syncope, weakness, light-headedness, headache and memory problem.   Psychiatric/Behavioral: Negative for sleep disturbance, depressed mood and stress. The patient is not nervous/anxious.    All other systems reviewed and are negative.      Objective   Physical Exam   Constitutional: He is oriented to person, place, and time. He appears well-developed and well-nourished. No distress.   HENT:   Head: Normocephalic and atraumatic.   Right Ear: External ear normal.   Left Ear: External ear normal.   Nose: Nose normal.   Mouth/Throat: Oropharynx is clear and moist.   Eyes: EOM are normal. Pupils are equal, round, and reactive to light.   Neck: Normal range of motion. Neck supple. No thyromegaly present.   Cardiovascular: Normal rate, regular rhythm and normal heart sounds. Exam reveals no gallop and no friction rub.   No murmur heard.  Pulmonary/Chest: Effort normal. He has no wheezes.   Abdominal: Soft. There is no tenderness.   Musculoskeletal: Normal range of motion. He exhibits no edema, tenderness or deformity.   Right shoulder tenderness with limited rom.  No gross deformity    Lymphadenopathy:     He has no cervical adenopathy.   Neurological: He is alert and oriented to person, place, and time. No cranial nerve deficit.   Skin: Skin is warm and dry. No rash noted. He is not diaphoretic.   Psychiatric: He has a normal mood and affect. His behavior is normal. Judgment and thought content normal.   Nursing note and vitals reviewed.        Assessment/Plan   There are no diagnoses linked to this encounter.           Chief Complaint   Patient presents with   • Shoulder Pain     Vitals:    07/10/19 1016   BP: 163/91   Pulse: 88   Resp: 17   Temp: 97.6 °F (36.4 °C)   SpO2: 99%     LDL Cholesterol    Date Value Ref Range Status   11/06/2018 76 0 - 100 mg/dL Final

## 2019-07-11 ENCOUNTER — TELEPHONE (OUTPATIENT)
Dept: FAMILY MEDICINE CLINIC | Facility: CLINIC | Age: 49
End: 2019-07-11

## 2019-07-11 RX ORDER — TRAMADOL HYDROCHLORIDE 50 MG/1
50 TABLET ORAL EVERY 6 HOURS PRN
Qty: 15 TABLET | Refills: 0 | Status: SHIPPED | OUTPATIENT
Start: 2019-07-11 | End: 2020-06-22

## 2019-07-26 DIAGNOSIS — M10.9 GOUT, UNSPECIFIED CAUSE, UNSPECIFIED CHRONICITY, UNSPECIFIED SITE: Primary | ICD-10-CM

## 2019-07-26 RX ORDER — ALLOPURINOL 300 MG/1
300 TABLET ORAL DAILY
Qty: 90 TABLET | Refills: 0 | Status: SHIPPED | OUTPATIENT
Start: 2019-07-26 | End: 2019-08-20 | Stop reason: SINTOL

## 2019-07-30 ENCOUNTER — OFFICE VISIT (OUTPATIENT)
Dept: FAMILY MEDICINE CLINIC | Facility: CLINIC | Age: 49
End: 2019-07-30

## 2019-07-30 VITALS
HEIGHT: 65 IN | RESPIRATION RATE: 17 BRPM | OXYGEN SATURATION: 96 % | DIASTOLIC BLOOD PRESSURE: 85 MMHG | TEMPERATURE: 98.1 F | WEIGHT: 255 LBS | BODY MASS INDEX: 42.49 KG/M2 | SYSTOLIC BLOOD PRESSURE: 153 MMHG | HEART RATE: 98 BPM

## 2019-07-30 DIAGNOSIS — I15.0 RENOVASCULAR HYPERTENSION: ICD-10-CM

## 2019-07-30 DIAGNOSIS — N18.9 CHRONIC KIDNEY DISEASE, UNSPECIFIED CKD STAGE: ICD-10-CM

## 2019-07-30 DIAGNOSIS — E66.01 CLASS 3 SEVERE OBESITY DUE TO EXCESS CALORIES WITH SERIOUS COMORBIDITY AND BODY MASS INDEX (BMI) OF 40.0 TO 44.9 IN ADULT (HCC): ICD-10-CM

## 2019-07-30 DIAGNOSIS — M10.371 ACUTE GOUT DUE TO RENAL IMPAIRMENT INVOLVING TOE OF RIGHT FOOT: Primary | ICD-10-CM

## 2019-07-30 DIAGNOSIS — E13.9 DIABETES 1.5, MANAGED AS TYPE 2 (HCC): ICD-10-CM

## 2019-07-30 LAB
ALBUMIN SERPL-MCNC: 4.1 G/DL (ref 3.5–4.8)
ALBUMIN/GLOB SERPL: 1 G/DL (ref 1–1.7)
ALP SERPL-CCNC: 88 U/L (ref 32–91)
ALT SERPL W P-5'-P-CCNC: 47 U/L (ref 17–63)
ANION GAP SERPL CALCULATED.3IONS-SCNC: 18.7 MMOL/L (ref 5–15)
AST SERPL-CCNC: 34 U/L (ref 15–41)
BILIRUB SERPL-MCNC: 0.7 MG/DL (ref 0.3–1.2)
BUN BLD-MCNC: 38 MG/DL (ref 8–20)
BUN/CREAT SERPL: 16.5 (ref 6.2–20.3)
CALCIUM SPEC-SCNC: 9.4 MG/DL (ref 8.9–10.3)
CHLORIDE SERPL-SCNC: 100 MMOL/L (ref 101–111)
CO2 SERPL-SCNC: 23 MMOL/L (ref 22–32)
CREAT BLD-MCNC: 2.3 MG/DL (ref 0.7–1.2)
GFR SERPL CREATININE-BSD FRML MDRD: 30 ML/MIN/1.73
GLOBULIN UR ELPH-MCNC: 4 GM/DL (ref 2.5–3.8)
GLUCOSE BLD-MCNC: 288 MG/DL (ref 65–99)
HBA1C MFR BLD: 7.4 %
POTASSIUM BLD-SCNC: 4.7 MMOL/L (ref 3.6–5.1)
PROT SERPL-MCNC: 8.1 G/DL (ref 6.1–7.9)
SODIUM BLD-SCNC: 137 MMOL/L (ref 136–144)
URATE SERPL-MCNC: 6.7 MG/DL (ref 4.8–8.7)

## 2019-07-30 PROCEDURE — 99214 OFFICE O/P EST MOD 30 MIN: CPT | Performed by: FAMILY MEDICINE

## 2019-07-30 PROCEDURE — 83036 HEMOGLOBIN GLYCOSYLATED A1C: CPT | Performed by: FAMILY MEDICINE

## 2019-07-30 PROCEDURE — 80053 COMPREHEN METABOLIC PANEL: CPT | Performed by: FAMILY MEDICINE

## 2019-07-30 PROCEDURE — 84550 ASSAY OF BLOOD/URIC ACID: CPT | Performed by: FAMILY MEDICINE

## 2019-07-30 RX ORDER — INSULIN GLARGINE 100 [IU]/ML
40 INJECTION, SOLUTION SUBCUTANEOUS DAILY
COMMUNITY
Start: 2019-07-30 | End: 2019-08-20 | Stop reason: SDUPTHER

## 2019-07-30 RX ORDER — COLCHICINE 0.6 MG/1
0.6 TABLET ORAL DAILY
Qty: 20 TABLET | Refills: 0 | Status: SHIPPED | OUTPATIENT
Start: 2019-07-30 | End: 2019-08-19 | Stop reason: SDUPTHER

## 2019-07-30 NOTE — PROGRESS NOTES
Subjective   Sam Prakash is a 48 y.o. male.     No problems updated.  History of Present Illness   Hx DM,CKD,Gout,HLD  Pt has had gout flare for the past week. Colchicine needs PA.  Fu DM: taking Basaglar 35 units, Ozempic.. 5, tradjenta 5 mg  for well check. Doing well. Activity and appetite good. Normal BM's and sleep.. tradjenta  Pt previously seen by Dr. Charla castro CKD  A1c=7.4    The following portions of the patient's history were reviewed and updated as appropriate: allergies, current medications, past family history, past medical history, past social history, past surgical history and problem list.    Past Medical History:   Diagnosis Date   • Gout    • Hearing loss    • Heart disease    • History of MI (myocardial infarction)    • History of type 2 diabetes mellitus    • Hyperlipidemia    • Hypertension        Past Surgical History:   Procedure Laterality Date   • CATARACT EXTRACTION Bilateral 05/2015   • OTHER SURGICAL HISTORY  12/2014    [facility] FOR HEART STENT       Family History   Problem Relation Age of Onset   • Heart disease Father    • Other Father         HEART ATTACK IN 2008   • Arthritis Maternal Grandmother    • Diabetes Maternal Grandfather    • Heart disease Maternal Grandfather    • Hypertension Maternal Grandfather    • Diabetes Paternal Grandfather    • Hyperlipidemia Paternal Grandfather    • Hypertension Paternal Grandfather        Review of Systems   Constitutional: Negative for fatigue, unexpected weight gain and unexpected weight loss.   HENT: Negative for congestion, sinus pressure and sore throat.         Hard of hearing   Eyes: Negative for blurred vision and visual disturbance.   Respiratory: Negative for cough, shortness of breath and wheezing.    Cardiovascular: Negative for chest pain, palpitations and leg swelling.   Gastrointestinal: Negative for abdominal pain, constipation, diarrhea, nausea, vomiting, GERD and indigestion.   Musculoskeletal: Negative for  arthralgias, back pain, gait problem, joint swelling and neck pain.        Right toe pain and swelling   Skin: Negative for rash, skin lesions and bruise.   Allergic/Immunologic: Negative for environmental allergies.   Neurological: Negative for dizziness, tremors, syncope, weakness, light-headedness, headache and memory problem.   Psychiatric/Behavioral: Negative for sleep disturbance, depressed mood and stress. The patient is not nervous/anxious.        Objective   Physical Exam   Constitutional: He is oriented to person, place, and time. He appears well-developed and well-nourished. No distress.   HENT:   Head: Normocephalic and atraumatic.   Right Ear: External ear normal.   Left Ear: External ear normal.   Nose: Nose normal.   Mouth/Throat: Oropharynx is clear and moist.   Hard of hearing   Eyes: EOM are normal. Pupils are equal, round, and reactive to light.   Neck: Normal range of motion. Neck supple. No thyromegaly present.   Cardiovascular: Normal rate, regular rhythm and normal heart sounds. Exam reveals no gallop and no friction rub.   No murmur heard.  Pulmonary/Chest: Effort normal. He has no wheezes.   Abdominal: Soft. There is no tenderness.   Musculoskeletal: Normal range of motion. He exhibits no edema, tenderness or deformity.   Right great toe with swelling and erythema, tender to touch   Lymphadenopathy:     He has no cervical adenopathy.   Neurological: He is alert and oriented to person, place, and time. No cranial nerve deficit.   Skin: Skin is warm and dry. No rash noted. He is not diaphoretic.   Psychiatric: He has a normal mood and affect. His behavior is normal. Judgment and thought content normal.   Nursing note and vitals reviewed.        Assessment/Plan   Sam was seen today for follow-up and gout.    Diagnoses and all orders for this visit:    Diabetes 1.5, managed as type 2 (CMS/[facility])    Other orders  -     colchicine 0.6 MG tablet; Take 1 tablet by mouth Daily.               Chief  Complaint   Patient presents with   • Follow-up   • Gout     rt foot      Vitals:    07/30/19 0824   BP: 153/85   Pulse: 98   Resp: 17   Temp: 98.1 °F (36.7 °C)   SpO2: 96%     LDL Cholesterol    Date Value Ref Range Status   11/06/2018 76 0 - 100 mg/dL Final

## 2019-07-30 NOTE — PATIENT INSTRUCTIONS
Rx colchicine  Fu labs  Increase Basaglar to 40 units   monitor blood glucose  Sampled ozempc   (may need PAfor ozempic  Needs fu with renal, previously seen by Dr. Dunham

## 2019-07-31 DIAGNOSIS — N18.30 CKD (CHRONIC KIDNEY DISEASE) STAGE 3, GFR 30-59 ML/MIN (HCC): Primary | ICD-10-CM

## 2019-08-13 RX ORDER — ALBUTEROL SULFATE 90 UG/1
2 AEROSOL, METERED RESPIRATORY (INHALATION) 3 TIMES DAILY PRN
Qty: 6.7 INHALER | Refills: 1 | Status: SHIPPED | OUTPATIENT
Start: 2019-08-13 | End: 2019-11-10 | Stop reason: SDUPTHER

## 2019-08-20 ENCOUNTER — OFFICE VISIT (OUTPATIENT)
Dept: FAMILY MEDICINE CLINIC | Facility: CLINIC | Age: 49
End: 2019-08-20

## 2019-08-20 VITALS
WEIGHT: 258 LBS | SYSTOLIC BLOOD PRESSURE: 160 MMHG | TEMPERATURE: 99 F | DIASTOLIC BLOOD PRESSURE: 81 MMHG | OXYGEN SATURATION: 98 % | HEART RATE: 92 BPM | RESPIRATION RATE: 18 BRPM | HEIGHT: 65 IN | BODY MASS INDEX: 42.99 KG/M2

## 2019-08-20 DIAGNOSIS — N18.30 CKD (CHRONIC KIDNEY DISEASE) STAGE 3, GFR 30-59 ML/MIN (HCC): ICD-10-CM

## 2019-08-20 DIAGNOSIS — M10.00 ACUTE IDIOPATHIC GOUT, UNSPECIFIED SITE: Primary | ICD-10-CM

## 2019-08-20 DIAGNOSIS — E11.65 UNCONTROLLED TYPE 2 DIABETES MELLITUS WITH HYPERGLYCEMIA (HCC): ICD-10-CM

## 2019-08-20 DIAGNOSIS — I15.0 RENOVASCULAR HYPERTENSION: ICD-10-CM

## 2019-08-20 LAB
ANION GAP SERPL CALCULATED.3IONS-SCNC: 16.9 MMOL/L (ref 5–15)
BILIRUB BLD-MCNC: NEGATIVE MG/DL
BUN BLD-MCNC: 41 MG/DL (ref 8–20)
BUN/CREAT SERPL: 18.6 (ref 6.2–20.3)
CALCIUM SPEC-SCNC: 9.3 MG/DL (ref 8.9–10.3)
CHLORIDE SERPL-SCNC: 101 MMOL/L (ref 101–111)
CLARITY, POC: CLEAR
CO2 SERPL-SCNC: 22 MMOL/L (ref 22–32)
COLOR UR: YELLOW
CREAT BLD-MCNC: 2.2 MG/DL (ref 0.7–1.2)
GFR SERPL CREATININE-BSD FRML MDRD: 32 ML/MIN/1.73
GLUCOSE BLD-MCNC: 238 MG/DL (ref 65–99)
GLUCOSE UR STRIP-MCNC: ABNORMAL MG/DL
KETONES UR QL: NEGATIVE
LEUKOCYTE EST, POC: NEGATIVE
NITRITE UR-MCNC: NEGATIVE MG/ML
PH UR: 5.5 [PH] (ref 5–8)
POTASSIUM BLD-SCNC: 3.9 MMOL/L (ref 3.6–5.1)
PROT UR STRIP-MCNC: ABNORMAL MG/DL
RBC # UR STRIP: NEGATIVE /UL
SODIUM BLD-SCNC: 136 MMOL/L (ref 136–144)
SP GR UR: 1.03 (ref 1–1.03)
URATE SERPL-MCNC: 6.7 MG/DL (ref 4.8–8.7)
UROBILINOGEN UR QL: NORMAL

## 2019-08-20 PROCEDURE — 99214 OFFICE O/P EST MOD 30 MIN: CPT | Performed by: FAMILY MEDICINE

## 2019-08-20 PROCEDURE — 80048 BASIC METABOLIC PNL TOTAL CA: CPT | Performed by: FAMILY MEDICINE

## 2019-08-20 PROCEDURE — 84550 ASSAY OF BLOOD/URIC ACID: CPT | Performed by: FAMILY MEDICINE

## 2019-08-20 RX ORDER — PREDNISONE 10 MG/1
10 TABLET ORAL DAILY
Qty: 10 TABLET | Refills: 0 | Status: SHIPPED | OUTPATIENT
Start: 2019-08-20 | End: 2019-10-30

## 2019-08-20 RX ORDER — INSULIN GLARGINE 100 [IU]/ML
53 INJECTION, SOLUTION SUBCUTANEOUS DAILY
COMMUNITY
Start: 2019-08-20 | End: 2019-11-06 | Stop reason: SDUPTHER

## 2019-08-20 RX ORDER — COLCHICINE 0.6 MG/1
TABLET ORAL
Qty: 20 TABLET | Refills: 0 | Status: SHIPPED | OUTPATIENT
Start: 2019-08-20 | End: 2019-08-27 | Stop reason: ALTCHOICE

## 2019-08-20 RX ORDER — CLONIDINE HYDROCHLORIDE 0.1 MG/1
0.1 TABLET ORAL 3 TIMES DAILY
Qty: 180 TABLET | Refills: 0 | COMMUNITY
Start: 2019-08-20 | End: 2019-10-08 | Stop reason: SDUPTHER

## 2019-08-20 NOTE — PATIENT INSTRUCTIONS
Take prednisone  Stop tradjenta and allopurinol due to decreased kidney function  Stop colchicine  Increase basaglar to 48 units  Monitor blood glucose  Fu 1 week  Fu nephrology wed 8/28/29  DR. Hirsch  Continue clonidine and hydralazine for bp

## 2019-08-20 NOTE — PROGRESS NOTES
Subjective   Sam Prakash is a 48 y.o. male.     Chief Complaint   Patient presents with   • Follow-up   • Gout       History of Present Illness   Pt reports still with gout in right great toe .  Labs reviewed with elevated glucose and creatinine noted.  Pt does have appt with nephrology  Pt instructed to stop tradjenta and allopurinol .Increase basalgar to 48 units, continue Ozempic  Continue hydralazine n clonidine for BP      The following portions of the patient's history were reviewed and updated as appropriate: allergies, current medications, past family history, past medical history, past social history, past surgical history and problem list.    Past Medical History:   Diagnosis Date   • CKD (chronic kidney disease)    • Gout    • Hard of hearing    • Hearing loss    • Heart disease    • History of MI (myocardial infarction)    • History of type 2 diabetes mellitus    • Hyperlipidemia    • Hypertension    • Myocardial infarction (CMS/HCC)    • Obesity        Past Surgical History:   Procedure Laterality Date   • CATARACT EXTRACTION Bilateral 05/2015   • OTHER SURGICAL HISTORY  12/2014    [facility] FOR HEART STENT       Family History   Problem Relation Age of Onset   • Heart disease Father    • Other Father         HEART ATTACK IN 2008   • Arthritis Maternal Grandmother    • Diabetes Maternal Grandfather    • Heart disease Maternal Grandfather    • Hypertension Maternal Grandfather    • Diabetes Paternal Grandfather    • Hyperlipidemia Paternal Grandfather    • Hypertension Paternal Grandfather        Review of Systems   Constitutional: Negative for fatigue, unexpected weight gain and unexpected weight loss.        Obese   HENT: Positive for hearing loss. Negative for congestion, sinus pressure and sore throat.    Eyes: Negative for blurred vision and visual disturbance.   Respiratory: Negative for cough, shortness of breath and wheezing.    Cardiovascular: Negative for chest pain, palpitations and  leg swelling.   Gastrointestinal: Negative for abdominal pain, constipation, diarrhea, nausea, vomiting, GERD and indigestion.   Musculoskeletal: Positive for arthralgias, gait problem and joint swelling. Negative for back pain and neck pain.        Right great toe pain   Skin: Negative for rash, skin lesions and bruise.   Allergic/Immunologic: Negative for environmental allergies.   Neurological: Negative for dizziness, tremors, syncope, weakness, light-headedness, headache and memory problem.   Psychiatric/Behavioral: Negative for sleep disturbance, depressed mood and stress. The patient is not nervous/anxious.        Objective   Physical Exam   Constitutional: He is oriented to person, place, and time. He appears well-developed and well-nourished. No distress.   HENT:   Head: Normocephalic and atraumatic.   Right Ear: External ear normal.   Left Ear: External ear normal.   Nose: Nose normal.   Mouth/Throat: Oropharynx is clear and moist.   Eyes: EOM are normal. Pupils are equal, round, and reactive to light.   Neck: Normal range of motion. Neck supple. No thyromegaly present.   Cardiovascular: Normal rate, regular rhythm and normal heart sounds. Exam reveals no gallop and no friction rub.   No murmur heard.  Pulmonary/Chest: Effort normal. He has no wheezes.   Abdominal: Soft. There is no tenderness.   Musculoskeletal: Normal range of motion. He exhibits tenderness. He exhibits no edema or deformity.   Right great toe   Lymphadenopathy:     He has no cervical adenopathy.   Neurological: He is alert and oriented to person, place, and time. No cranial nerve deficit.   Skin: Skin is warm and dry. No rash noted. He is not diaphoretic.   Psychiatric: He has a normal mood and affect. His behavior is normal. Judgment and thought content normal.   Nursing note and vitals reviewed.        Assessment/Plan   Sam was seen today for follow-up and gout.    Diagnoses and all orders for this visit:    Acute idiopathic gout,  unspecified site  -     Basic Metabolic Panel  -     predniSONE (DELTASONE) 10 MG tablet; Take 1 tablet by mouth Daily.  -     Uric acid; Future    Uncontrolled type 2 diabetes mellitus with hyperglycemia (CMS/[facility])    Renovascular hypertension    CKD (chronic kidney disease) stage 3, GFR 30-59 ml/min (CMS/[facility])        Take prednisone  Stop tradjenta and allopurinol due to decreased kidney function  Stop colchicine  Increase basaglar to 48 units  Monitor blood glucose  Fu 1 week  Fu nephrology wed 8/28/29  DR. Hirsch           Vitals:    08/20/19 1402   BP: 160/81   Pulse: 92   Resp: 18   Temp: 99 °F (37.2 °C)   SpO2: 98%       Hemoglobin A1C   Date Value Ref Range Status   07/30/2019 7.4 % Final     LDL Cholesterol    Date Value Ref Range Status   11/06/2018 76 0 - 100 mg/dL Final

## 2019-08-27 ENCOUNTER — OFFICE VISIT (OUTPATIENT)
Dept: FAMILY MEDICINE CLINIC | Facility: CLINIC | Age: 49
End: 2019-08-27

## 2019-08-27 VITALS
BODY MASS INDEX: 43.02 KG/M2 | HEART RATE: 90 BPM | WEIGHT: 258.2 LBS | DIASTOLIC BLOOD PRESSURE: 82 MMHG | HEIGHT: 65 IN | SYSTOLIC BLOOD PRESSURE: 157 MMHG | OXYGEN SATURATION: 98 % | TEMPERATURE: 97.6 F

## 2019-08-27 DIAGNOSIS — N28.89 HYPERTENSION SECONDARY TO OTHER RENAL DISORDERS: ICD-10-CM

## 2019-08-27 DIAGNOSIS — M10.071 ACUTE IDIOPATHIC GOUT OF RIGHT FOOT: ICD-10-CM

## 2019-08-27 DIAGNOSIS — E66.01 CLASS 3 SEVERE OBESITY DUE TO EXCESS CALORIES WITH BODY MASS INDEX (BMI) OF 40.0 TO 44.9 IN ADULT, UNSPECIFIED WHETHER SERIOUS COMORBIDITY PRESENT (HCC): ICD-10-CM

## 2019-08-27 DIAGNOSIS — E11.8 TYPE 2 DIABETES MELLITUS WITH COMPLICATION, UNSPECIFIED WHETHER LONG TERM INSULIN USE: Primary | ICD-10-CM

## 2019-08-27 DIAGNOSIS — N18.30 CKD (CHRONIC KIDNEY DISEASE), STAGE III (HCC): ICD-10-CM

## 2019-08-27 DIAGNOSIS — I15.1 HYPERTENSION SECONDARY TO OTHER RENAL DISORDERS: ICD-10-CM

## 2019-08-27 LAB — GLUCOSE BLDC GLUCOMTR-MCNC: 325 MG/DL (ref 70–130)

## 2019-08-27 PROCEDURE — 82948 REAGENT STRIP/BLOOD GLUCOSE: CPT | Performed by: FAMILY MEDICINE

## 2019-08-27 PROCEDURE — 99214 OFFICE O/P EST MOD 30 MIN: CPT | Performed by: FAMILY MEDICINE

## 2019-08-27 RX ORDER — TIMOLOL MALEATE 5 MG/ML
SOLUTION/ DROPS OPHTHALMIC
COMMUNITY
Start: 2018-02-05

## 2019-08-27 RX ORDER — LATANOPROST 50 UG/ML
SOLUTION/ DROPS OPHTHALMIC
Refills: 3 | COMMUNITY
Start: 2019-08-08

## 2019-08-27 RX ORDER — DORZOLAMIDE HYDROCHLORIDE AND TIMOLOL MALEATE 20; 5 MG/ML; MG/ML
1 SOLUTION/ DROPS OPHTHALMIC 2 TIMES DAILY
Refills: 5 | COMMUNITY
Start: 2019-06-17

## 2019-08-27 RX ORDER — GLIPIZIDE 5 MG/1
1 TABLET ORAL DAILY
COMMUNITY
Start: 2018-02-05 | End: 2019-10-30

## 2019-09-19 RX ORDER — CLONIDINE HYDROCHLORIDE 0.1 MG/1
TABLET ORAL
Qty: 180 TABLET | Refills: 0 | Status: SHIPPED | OUTPATIENT
Start: 2019-09-19 | End: 2019-12-03 | Stop reason: ALTCHOICE

## 2019-10-08 RX ORDER — CLONIDINE HYDROCHLORIDE 0.1 MG/1
0.1 TABLET ORAL 3 TIMES DAILY
Qty: 180 TABLET | Refills: 0 | Status: SHIPPED | OUTPATIENT
Start: 2019-10-08 | End: 2019-10-30

## 2019-10-30 ENCOUNTER — OFFICE VISIT (OUTPATIENT)
Dept: FAMILY MEDICINE CLINIC | Facility: CLINIC | Age: 49
End: 2019-10-30

## 2019-10-30 VITALS
BODY MASS INDEX: 43.15 KG/M2 | WEIGHT: 259 LBS | OXYGEN SATURATION: 99 % | DIASTOLIC BLOOD PRESSURE: 79 MMHG | HEIGHT: 65 IN | SYSTOLIC BLOOD PRESSURE: 142 MMHG | HEART RATE: 82 BPM | TEMPERATURE: 98.3 F

## 2019-10-30 DIAGNOSIS — J45.20 MILD INTERMITTENT ASTHMA WITHOUT COMPLICATION: ICD-10-CM

## 2019-10-30 DIAGNOSIS — E13.69 OTHER SPECIFIED DIABETES MELLITUS WITH OTHER SPECIFIED COMPLICATION, UNSPECIFIED WHETHER LONG TERM INSULIN USE (HCC): Primary | ICD-10-CM

## 2019-10-30 DIAGNOSIS — E53.8 VITAMIN B12 DEFICIENCY: ICD-10-CM

## 2019-10-30 DIAGNOSIS — E03.9 HYPOTHYROIDISM, UNSPECIFIED TYPE: ICD-10-CM

## 2019-10-30 DIAGNOSIS — N28.89 HYPERTENSION SECONDARY TO OTHER RENAL DISORDERS: ICD-10-CM

## 2019-10-30 DIAGNOSIS — I15.1 HYPERTENSION SECONDARY TO OTHER RENAL DISORDERS: ICD-10-CM

## 2019-10-30 DIAGNOSIS — E11.65 UNCONTROLLED TYPE 2 DIABETES MELLITUS WITH HYPERGLYCEMIA (HCC): ICD-10-CM

## 2019-10-30 DIAGNOSIS — E78.2 MIXED HYPERLIPIDEMIA: ICD-10-CM

## 2019-10-30 DIAGNOSIS — S49.91XS INJURY OF RIGHT SHOULDER, SEQUELA: ICD-10-CM

## 2019-10-30 DIAGNOSIS — E55.9 VITAMIN D DEFICIENCY: ICD-10-CM

## 2019-10-30 LAB
BASOPHILS # BLD AUTO: 0.07 10*3/MM3 (ref 0–0.2)
BASOPHILS NFR BLD AUTO: 0.7 % (ref 0–1.5)
DEPRECATED RDW RBC AUTO: 44.7 FL (ref 37–54)
EOSINOPHIL # BLD AUTO: 0.33 10*3/MM3 (ref 0–0.4)
EOSINOPHIL NFR BLD AUTO: 3.4 % (ref 0.3–6.2)
ERYTHROCYTE [DISTWIDTH] IN BLOOD BY AUTOMATED COUNT: 14.1 % (ref 12.3–15.4)
GLUCOSE BLDC GLUCOMTR-MCNC: 158 MG/DL (ref 70–130)
HBA1C MFR BLD: 8.1 %
HCT VFR BLD AUTO: 43.6 % (ref 37.5–51)
HGB BLD-MCNC: 14.9 G/DL (ref 13–17.7)
IMM GRANULOCYTES # BLD AUTO: 0.03 10*3/MM3 (ref 0–0.05)
IMM GRANULOCYTES NFR BLD AUTO: 0.3 % (ref 0–0.5)
LYMPHOCYTES # BLD AUTO: 2.17 10*3/MM3 (ref 0.7–3.1)
LYMPHOCYTES NFR BLD AUTO: 22.3 % (ref 19.6–45.3)
MCH RBC QN AUTO: 30.3 PG (ref 26.6–33)
MCHC RBC AUTO-ENTMCNC: 34.2 G/DL (ref 31.5–35.7)
MCV RBC AUTO: 88.8 FL (ref 79–97)
MONOCYTES # BLD AUTO: 0.68 10*3/MM3 (ref 0.1–0.9)
MONOCYTES NFR BLD AUTO: 7 % (ref 5–12)
NEUTROPHILS # BLD AUTO: 6.46 10*3/MM3 (ref 1.7–7)
NEUTROPHILS NFR BLD AUTO: 66.3 % (ref 42.7–76)
NRBC BLD AUTO-RTO: 0.2 /100 WBC (ref 0–0.2)
PLATELET # BLD AUTO: 215 10*3/MM3 (ref 140–450)
PMV BLD AUTO: 12 FL (ref 6–12)
RBC # BLD AUTO: 4.91 10*6/MM3 (ref 4.14–5.8)
WBC NRBC COR # BLD: 9.74 10*3/MM3 (ref 3.4–10.8)

## 2019-10-30 PROCEDURE — 84439 ASSAY OF FREE THYROXINE: CPT | Performed by: NURSE PRACTITIONER

## 2019-10-30 PROCEDURE — 82607 VITAMIN B-12: CPT | Performed by: NURSE PRACTITIONER

## 2019-10-30 PROCEDURE — 82043 UR ALBUMIN QUANTITATIVE: CPT | Performed by: NURSE PRACTITIONER

## 2019-10-30 PROCEDURE — 85025 COMPLETE CBC W/AUTO DIFF WBC: CPT | Performed by: NURSE PRACTITIONER

## 2019-10-30 PROCEDURE — 82962 GLUCOSE BLOOD TEST: CPT | Performed by: NURSE PRACTITIONER

## 2019-10-30 PROCEDURE — 82306 VITAMIN D 25 HYDROXY: CPT | Performed by: NURSE PRACTITIONER

## 2019-10-30 PROCEDURE — 99214 OFFICE O/P EST MOD 30 MIN: CPT | Performed by: NURSE PRACTITIONER

## 2019-10-30 PROCEDURE — 80061 LIPID PANEL: CPT | Performed by: NURSE PRACTITIONER

## 2019-10-30 PROCEDURE — 83036 HEMOGLOBIN GLYCOSYLATED A1C: CPT | Performed by: NURSE PRACTITIONER

## 2019-10-30 PROCEDURE — 84443 ASSAY THYROID STIM HORMONE: CPT | Performed by: NURSE PRACTITIONER

## 2019-10-30 PROCEDURE — 80053 COMPREHEN METABOLIC PANEL: CPT | Performed by: NURSE PRACTITIONER

## 2019-10-30 RX ORDER — SEMAGLUTIDE 1.34 MG/ML
INJECTION, SOLUTION SUBCUTANEOUS
Refills: 2 | COMMUNITY
Start: 2019-10-14 | End: 2019-11-18 | Stop reason: ALTCHOICE

## 2019-10-30 RX ORDER — LOSARTAN POTASSIUM 100 MG/1
100 TABLET ORAL DAILY
Qty: 90 TABLET | Refills: 0 | Status: SHIPPED | OUTPATIENT
Start: 2019-10-30 | End: 2020-03-16

## 2019-10-30 RX ORDER — LOSARTAN POTASSIUM 100 MG/1
100 TABLET ORAL DAILY
Refills: 0 | COMMUNITY
Start: 2019-10-02 | End: 2019-10-30

## 2019-10-30 RX ORDER — ALLOPURINOL 100 MG/1
300 TABLET ORAL 2 TIMES DAILY
Refills: 3 | COMMUNITY
Start: 2019-09-07 | End: 2020-03-12 | Stop reason: SDUPTHER

## 2019-10-31 LAB
25(OH)D3 SERPL-MCNC: 26.9 NG/ML (ref 30–100)
ALBUMIN SERPL-MCNC: 4.8 G/DL (ref 3.5–5.2)
ALBUMIN UR-MCNC: 218 MG/DL
ALBUMIN/GLOB SERPL: 1.3 G/DL
ALP SERPL-CCNC: 86 U/L (ref 39–117)
ALT SERPL W P-5'-P-CCNC: 35 U/L (ref 1–41)
ANION GAP SERPL CALCULATED.3IONS-SCNC: 13.1 MMOL/L (ref 5–15)
AST SERPL-CCNC: 32 U/L (ref 1–40)
BILIRUB SERPL-MCNC: 0.5 MG/DL (ref 0.2–1.2)
BUN BLD-MCNC: 30 MG/DL (ref 6–20)
BUN/CREAT SERPL: 15.2 (ref 7–25)
CALCIUM SPEC-SCNC: 9.6 MG/DL (ref 8.6–10.5)
CHLORIDE SERPL-SCNC: 97 MMOL/L (ref 98–107)
CHOLEST SERPL-MCNC: 177 MG/DL (ref 0–200)
CO2 SERPL-SCNC: 24.9 MMOL/L (ref 22–29)
CREAT BLD-MCNC: 1.97 MG/DL (ref 0.76–1.27)
GFR SERPL CREATININE-BSD FRML MDRD: 36 ML/MIN/1.73
GLOBULIN UR ELPH-MCNC: 3.6 GM/DL
GLUCOSE BLD-MCNC: 176 MG/DL (ref 65–99)
HDLC SERPL-MCNC: 33 MG/DL (ref 40–60)
LDLC SERPL CALC-MCNC: 72 MG/DL (ref 0–100)
LDLC/HDLC SERPL: 2.18 {RATIO}
POTASSIUM BLD-SCNC: 4.4 MMOL/L (ref 3.5–5.2)
PROT SERPL-MCNC: 8.4 G/DL (ref 6–8.5)
SODIUM BLD-SCNC: 135 MMOL/L (ref 136–145)
T4 FREE SERPL-MCNC: 1.16 NG/DL (ref 0.93–1.7)
TRIGL SERPL-MCNC: 360 MG/DL (ref 0–150)
TSH SERPL DL<=0.05 MIU/L-ACNC: 3.89 UIU/ML (ref 0.27–4.2)
VIT B12 BLD-MCNC: 779 PG/ML (ref 211–946)
VLDLC SERPL-MCNC: 72 MG/DL (ref 5–40)

## 2019-11-06 ENCOUNTER — TELEPHONE (OUTPATIENT)
Dept: FAMILY MEDICINE CLINIC | Facility: CLINIC | Age: 49
End: 2019-11-06

## 2019-11-06 DIAGNOSIS — E13.9 DIABETES 1.5, MANAGED AS TYPE 2 (HCC): Primary | ICD-10-CM

## 2019-11-06 RX ORDER — INSULIN GLARGINE 100 [IU]/ML
53 INJECTION, SOLUTION SUBCUTANEOUS DAILY
Qty: 9 PEN | Refills: 1 | Status: SHIPPED | OUTPATIENT
Start: 2019-11-06 | End: 2019-12-04 | Stop reason: SDUPTHER

## 2019-11-06 RX ORDER — INSULIN GLARGINE 100 [IU]/ML
53 INJECTION, SOLUTION SUBCUTANEOUS DAILY
Qty: 9 PEN | Refills: 1 | Status: SHIPPED | OUTPATIENT
Start: 2019-11-06 | End: 2019-11-06 | Stop reason: SDUPTHER

## 2019-11-06 RX ORDER — LINAGLIPTIN 5 MG/1
TABLET, FILM COATED ORAL
Qty: 90 TABLET | Refills: 0 | Status: SHIPPED | OUTPATIENT
Start: 2019-11-06 | End: 2020-01-29

## 2019-11-07 ENCOUNTER — TELEPHONE (OUTPATIENT)
Dept: FAMILY MEDICINE CLINIC | Facility: CLINIC | Age: 49
End: 2019-11-07

## 2019-11-07 NOTE — TELEPHONE ENCOUNTER
PATIENT'S MOTHER CALLED AND SAID THEY COULDN'T  HIS BASAGLAR BECAUSE THEY SAID THEY NEED CLARIFICATION FROM US ABOUT THE SCRIPT. HE'S BEEN OUT SINCE YESTERDAY SO THIS NEEDS FIXED ASAP.

## 2019-11-11 RX ORDER — ALBUTEROL SULFATE 90 UG/1
AEROSOL, METERED RESPIRATORY (INHALATION)
Qty: 6.7 INHALER | Refills: 1 | Status: SHIPPED | OUTPATIENT
Start: 2019-11-11 | End: 2020-01-02

## 2019-11-11 RX ORDER — INDAPAMIDE 2.5 MG/1
TABLET, FILM COATED ORAL
Qty: 90 TABLET | Refills: 0 | OUTPATIENT
Start: 2019-11-11

## 2019-11-11 NOTE — TELEPHONE ENCOUNTER
Please ask patient if he is still taking as the charts reports this as discontinued. Let me know thank you Marni

## 2019-11-17 DIAGNOSIS — S49.91XS INJURY OF RIGHT SHOULDER, SEQUELA: ICD-10-CM

## 2019-11-18 RX ORDER — SEMAGLUTIDE 1.34 MG/ML
INJECTION, SOLUTION SUBCUTANEOUS
Qty: 1 PEN | Refills: 2 | Status: SHIPPED | OUTPATIENT
Start: 2019-11-18 | End: 2019-12-16 | Stop reason: SDUPTHER

## 2019-12-03 RX ORDER — CLONIDINE HYDROCHLORIDE 0.1 MG/1
TABLET ORAL
Qty: 180 TABLET | Refills: 0 | Status: SHIPPED | OUTPATIENT
Start: 2019-12-03 | End: 2020-01-27

## 2019-12-04 DIAGNOSIS — E13.9 DIABETES 1.5, MANAGED AS TYPE 2 (HCC): ICD-10-CM

## 2019-12-04 RX ORDER — INSULIN GLARGINE 100 [IU]/ML
53 INJECTION, SOLUTION SUBCUTANEOUS DAILY
Qty: 9 PEN | Refills: 4 | Status: SHIPPED | OUTPATIENT
Start: 2019-12-04 | End: 2020-07-22 | Stop reason: SDUPTHER

## 2019-12-13 RX ORDER — ERGOCALCIFEROL 1.25 MG/1
50000 CAPSULE ORAL
Qty: 8 CAPSULE | Refills: 0 | Status: SHIPPED | OUTPATIENT
Start: 2019-12-13 | End: 2020-02-01

## 2019-12-16 RX ORDER — SEMAGLUTIDE 1.34 MG/ML
INJECTION, SOLUTION SUBCUTANEOUS
Qty: 1.5 PEN | Refills: 2 | Status: SHIPPED | OUTPATIENT
Start: 2019-12-16 | End: 2020-03-09 | Stop reason: SDUPTHER

## 2019-12-18 DIAGNOSIS — S49.91XS INJURY OF RIGHT SHOULDER, SEQUELA: ICD-10-CM

## 2019-12-23 RX ORDER — AMLODIPINE BESYLATE 10 MG/1
TABLET ORAL
Qty: 90 TABLET | Refills: 0 | Status: SHIPPED | OUTPATIENT
Start: 2019-12-23 | End: 2020-04-14

## 2020-01-02 RX ORDER — ALBUTEROL SULFATE 90 UG/1
AEROSOL, METERED RESPIRATORY (INHALATION)
Qty: 6.7 INHALER | Refills: 1 | Status: SHIPPED | OUTPATIENT
Start: 2020-01-02 | End: 2020-05-27 | Stop reason: SDUPTHER

## 2020-01-06 RX ORDER — VITAMIN B COMPLEX
1000 TABLET ORAL DAILY
Qty: 90 TABLET | Refills: 0 | Status: SHIPPED | OUTPATIENT
Start: 2020-01-06 | End: 2020-04-14

## 2020-01-06 RX ORDER — ERGOCALCIFEROL 1.25 MG/1
50000 CAPSULE ORAL
Qty: 4 CAPSULE | Refills: 1 | OUTPATIENT
Start: 2020-01-06 | End: 2020-02-25

## 2020-01-08 DIAGNOSIS — E13.9 DIABETES 1.5, MANAGED AS TYPE 2 (HCC): Primary | ICD-10-CM

## 2020-01-27 RX ORDER — CLONIDINE HYDROCHLORIDE 0.1 MG/1
TABLET ORAL
Qty: 180 TABLET | Refills: 0 | Status: SHIPPED | OUTPATIENT
Start: 2020-01-27 | End: 2020-03-30

## 2020-01-29 RX ORDER — LINAGLIPTIN 5 MG/1
TABLET, FILM COATED ORAL
Qty: 90 TABLET | Refills: 0 | Status: SHIPPED | OUTPATIENT
Start: 2020-01-29 | End: 2020-04-30

## 2020-02-17 RX ORDER — ERGOCALCIFEROL 1.25 MG/1
50000 CAPSULE ORAL
Qty: 4 CAPSULE | Refills: 1 | OUTPATIENT
Start: 2020-02-17 | End: 2020-04-07

## 2020-02-24 DIAGNOSIS — H91.90 HEARING LOSS, UNSPECIFIED HEARING LOSS TYPE, UNSPECIFIED LATERALITY: Primary | ICD-10-CM

## 2020-03-09 DIAGNOSIS — E13.9 DIABETES 1.5, MANAGED AS TYPE 2 (HCC): Primary | ICD-10-CM

## 2020-03-11 RX ORDER — HYDRALAZINE HYDROCHLORIDE 100 MG/1
100 TABLET, FILM COATED ORAL EVERY 8 HOURS
Qty: 270 TABLET | Refills: 0 | Status: SHIPPED | OUTPATIENT
Start: 2020-03-11 | End: 2020-08-31

## 2020-03-12 RX ORDER — ALLOPURINOL 100 MG/1
300 TABLET ORAL 2 TIMES DAILY
Qty: 180 TABLET | Refills: 1 | Status: SHIPPED | OUTPATIENT
Start: 2020-03-12 | End: 2020-04-08

## 2020-03-16 DIAGNOSIS — E13.9 DIABETES 1.5, MANAGED AS TYPE 2 (HCC): ICD-10-CM

## 2020-03-16 RX ORDER — LOSARTAN POTASSIUM 100 MG/1
TABLET ORAL
Qty: 90 TABLET | Refills: 0 | Status: SHIPPED | OUTPATIENT
Start: 2020-03-16 | End: 2020-06-08

## 2020-03-30 RX ORDER — CLONIDINE HYDROCHLORIDE 0.1 MG/1
TABLET ORAL
Qty: 180 TABLET | Refills: 0 | Status: SHIPPED | OUTPATIENT
Start: 2020-03-30 | End: 2020-04-17

## 2020-04-08 RX ORDER — ALLOPURINOL 100 MG/1
300 TABLET ORAL 2 TIMES DAILY
Qty: 180 TABLET | Refills: 1 | Status: SHIPPED | OUTPATIENT
Start: 2020-04-08 | End: 2020-05-12

## 2020-04-14 RX ORDER — AMLODIPINE BESYLATE 10 MG/1
TABLET ORAL
Qty: 90 TABLET | Refills: 0 | Status: SHIPPED | OUTPATIENT
Start: 2020-04-14 | End: 2020-07-10

## 2020-04-14 RX ORDER — MELATONIN
Qty: 90 TABLET | Refills: 0 | Status: SHIPPED | OUTPATIENT
Start: 2020-04-14 | End: 2020-07-29

## 2020-04-17 RX ORDER — CLONIDINE HYDROCHLORIDE 0.1 MG/1
TABLET ORAL
Qty: 180 TABLET | Refills: 0 | Status: SHIPPED | OUTPATIENT
Start: 2020-04-17 | End: 2020-06-04

## 2020-04-28 RX ORDER — ROSUVASTATIN CALCIUM 10 MG/1
10 TABLET, COATED ORAL NIGHTLY
Qty: 90 TABLET | Refills: 0 | Status: SHIPPED | OUTPATIENT
Start: 2020-04-28 | End: 2020-07-27

## 2020-04-28 NOTE — TELEPHONE ENCOUNTER
Patient wife calling to request refill on rosuvastatin (CRESTOR) 10 MG tablet to be sent to the [facility] on [facility].

## 2020-04-30 RX ORDER — LINAGLIPTIN 5 MG/1
TABLET, FILM COATED ORAL
Qty: 90 TABLET | Refills: 0 | Status: SHIPPED | OUTPATIENT
Start: 2020-04-30 | End: 2020-08-04

## 2020-05-12 RX ORDER — ALLOPURINOL 100 MG/1
TABLET ORAL
Qty: 180 TABLET | Refills: 1 | Status: SHIPPED | OUTPATIENT
Start: 2020-05-12 | End: 2020-06-04

## 2020-05-19 DIAGNOSIS — E13.9 DIABETES 1.5, MANAGED AS TYPE 2 (HCC): ICD-10-CM

## 2020-05-19 RX ORDER — BLOOD-GLUCOSE METER
KIT MISCELLANEOUS
Qty: 200 EACH | Refills: 3 | Status: SHIPPED | OUTPATIENT
Start: 2020-05-19 | End: 2020-06-26 | Stop reason: CLARIF

## 2020-05-26 DIAGNOSIS — E13.9 DIABETES 1.5, MANAGED AS TYPE 2 (HCC): ICD-10-CM

## 2020-05-26 RX ORDER — SEMAGLUTIDE 1.34 MG/ML
INJECTION, SOLUTION SUBCUTANEOUS
Qty: 2 PEN | Refills: 2 | Status: SHIPPED | OUTPATIENT
Start: 2020-05-26 | End: 2020-09-09

## 2020-05-26 NOTE — TELEPHONE ENCOUNTER
Date of last refill: 3-    Is there an Inspect on file: n/a    Last appt date:  1030/2019  Next appt date: none scheduled       Additional information: last a1c was 10/30/2019

## 2020-05-27 RX ORDER — ALBUTEROL SULFATE 90 UG/1
2 AEROSOL, METERED RESPIRATORY (INHALATION) EVERY 4 HOURS PRN
Qty: 6.7 INHALER | Refills: 1 | Status: SHIPPED | OUTPATIENT
Start: 2020-05-27 | End: 2020-06-22

## 2020-05-27 NOTE — TELEPHONE ENCOUNTER
Date of last refill:01/02/2020    Is there an Inspect on file:N/A    Last appt date:10/30/2019     Next appt date:N/A      Additional information: PHARMACY REQUESTING REFILL

## 2020-06-04 RX ORDER — ALLOPURINOL 100 MG/1
300 TABLET ORAL 2 TIMES DAILY
Qty: 180 TABLET | Refills: 0 | Status: SHIPPED | OUTPATIENT
Start: 2020-06-04 | End: 2020-07-01

## 2020-06-04 RX ORDER — CLONIDINE HYDROCHLORIDE 0.1 MG/1
0.1 TABLET ORAL 3 TIMES DAILY
Qty: 90 TABLET | Refills: 0 | Status: SHIPPED | OUTPATIENT
Start: 2020-06-04 | End: 2020-08-20

## 2020-06-04 NOTE — TELEPHONE ENCOUNTER
Date of last refill:   Allopurinol: 5-  Clonidine: 4-    Is there an Inspect on file:    Last appt date: 10-     Next appt date: NONE       Additional information:  NOTE PLACED ON PATIENT SIG THAT APPT WILL NEED TO  BE SCHEDULED PRIOR TO ADDITIONAL REFILLS BEING AUTHORIZED. 30 DAY SUPPLY ONLY.

## 2020-06-08 RX ORDER — LOSARTAN POTASSIUM 100 MG/1
100 TABLET ORAL DAILY
Qty: 30 TABLET | Refills: 0 | Status: SHIPPED | OUTPATIENT
Start: 2020-06-08 | End: 2020-07-02

## 2020-06-08 NOTE — TELEPHONE ENCOUNTER
Date of last refill: 3-    Is there an Inspect on file: n/a    Last appt date: 10-30-19     Next appt date: NONE       Additional information:  30 day supply only. Note placed on sig that patient must schedule appt for further refills.

## 2020-06-22 ENCOUNTER — OFFICE VISIT (OUTPATIENT)
Dept: FAMILY MEDICINE CLINIC | Facility: CLINIC | Age: 50
End: 2020-06-22

## 2020-06-22 VITALS
SYSTOLIC BLOOD PRESSURE: 173 MMHG | WEIGHT: 260.4 LBS | HEART RATE: 91 BPM | TEMPERATURE: 98.1 F | BODY MASS INDEX: 43.39 KG/M2 | RESPIRATION RATE: 20 BRPM | DIASTOLIC BLOOD PRESSURE: 93 MMHG | HEIGHT: 65 IN | OXYGEN SATURATION: 97 %

## 2020-06-22 DIAGNOSIS — N18.30 CKD (CHRONIC KIDNEY DISEASE), STAGE III (HCC): ICD-10-CM

## 2020-06-22 DIAGNOSIS — E78.2 MIXED HYPERLIPIDEMIA: ICD-10-CM

## 2020-06-22 DIAGNOSIS — E11.9 TYPE 2 DIABETES MELLITUS WITHOUT COMPLICATION, WITH LONG-TERM CURRENT USE OF INSULIN (HCC): Primary | ICD-10-CM

## 2020-06-22 DIAGNOSIS — E55.9 VITAMIN D DEFICIENCY: ICD-10-CM

## 2020-06-22 DIAGNOSIS — E53.8 VITAMIN B12 DEFICIENCY: ICD-10-CM

## 2020-06-22 DIAGNOSIS — E13.9 DIABETES 1.5, MANAGED AS TYPE 2 (HCC): ICD-10-CM

## 2020-06-22 DIAGNOSIS — E03.9 HYPOTHYROIDISM, UNSPECIFIED TYPE: ICD-10-CM

## 2020-06-22 DIAGNOSIS — Z12.5 SCREENING PSA (PROSTATE SPECIFIC ANTIGEN): ICD-10-CM

## 2020-06-22 DIAGNOSIS — Z79.4 TYPE 2 DIABETES MELLITUS WITHOUT COMPLICATION, WITH LONG-TERM CURRENT USE OF INSULIN (HCC): Primary | ICD-10-CM

## 2020-06-22 PROBLEM — E78.5 HYPERLIPIDEMIA: Status: ACTIVE | Noted: 2018-08-08

## 2020-06-22 PROBLEM — H91.90 HEARING LOSS: Status: ACTIVE | Noted: 2020-06-22

## 2020-06-22 PROBLEM — I10 HYPERTENSION: Status: ACTIVE | Noted: 2018-08-08

## 2020-06-22 PROBLEM — D64.9 ANEMIA: Status: ACTIVE | Noted: 2020-06-22

## 2020-06-22 PROBLEM — I25.10 ATHEROSCLEROTIC HEART DISEASE OF NATIVE CORONARY ARTERY WITHOUT ANGINA PECTORIS: Status: ACTIVE | Noted: 2018-08-08

## 2020-06-22 PROBLEM — M10.9 GOUT: Status: ACTIVE | Noted: 2018-08-08

## 2020-06-22 PROBLEM — E66.9 OBESITY: Status: ACTIVE | Noted: 2020-06-22

## 2020-06-22 PROBLEM — E11.8 DISORDER ASSOCIATED WITH TYPE 2 DIABETES MELLITUS (HCC): Status: ACTIVE | Noted: 2018-11-30

## 2020-06-22 LAB
EXPIRATION DATE: ABNORMAL
HBA1C MFR BLD: 7.7 %
Lab: ABNORMAL

## 2020-06-22 PROCEDURE — 82306 VITAMIN D 25 HYDROXY: CPT | Performed by: NURSE PRACTITIONER

## 2020-06-22 PROCEDURE — 85025 COMPLETE CBC W/AUTO DIFF WBC: CPT | Performed by: NURSE PRACTITIONER

## 2020-06-22 PROCEDURE — 99213 OFFICE O/P EST LOW 20 MIN: CPT | Performed by: NURSE PRACTITIONER

## 2020-06-22 PROCEDURE — 80053 COMPREHEN METABOLIC PANEL: CPT | Performed by: NURSE PRACTITIONER

## 2020-06-22 PROCEDURE — G0103 PSA SCREENING: HCPCS | Performed by: NURSE PRACTITIONER

## 2020-06-22 PROCEDURE — 82043 UR ALBUMIN QUANTITATIVE: CPT | Performed by: NURSE PRACTITIONER

## 2020-06-22 PROCEDURE — 83721 ASSAY OF BLOOD LIPOPROTEIN: CPT | Performed by: NURSE PRACTITIONER

## 2020-06-22 PROCEDURE — 84439 ASSAY OF FREE THYROXINE: CPT | Performed by: NURSE PRACTITIONER

## 2020-06-22 PROCEDURE — 83036 HEMOGLOBIN GLYCOSYLATED A1C: CPT | Performed by: NURSE PRACTITIONER

## 2020-06-22 PROCEDURE — 80061 LIPID PANEL: CPT | Performed by: NURSE PRACTITIONER

## 2020-06-22 PROCEDURE — 84443 ASSAY THYROID STIM HORMONE: CPT | Performed by: NURSE PRACTITIONER

## 2020-06-22 PROCEDURE — 82607 VITAMIN B-12: CPT | Performed by: NURSE PRACTITIONER

## 2020-06-22 NOTE — PROGRESS NOTES
Chief Complaint   Patient presents with   • Diabetes     Tests BID. Lowest = 140; Highest = 200. Needs new glucometer.    • Hypertension     Does not check BP at home.    • Hyperlipidemia     Is fasting for lab work this morning.         Subjective   Sam Prakash is a 49 y.o.  male who presents today for  F/u on   Would like to try freestyle Gerson  Diabetic foot exam delayed referral to podiatry. Exam feet daily  • Diabetes    Tests BID. Lowest = 140; Highest = 200. Needs new glucometer.   • Hypertension    Does not check BP at home.   • Hyperlipidemia    Is fasting for lab work this morning.       Sam Prakash  has a past medical history of CKD (chronic kidney disease), Diabetes mellitus (CMS/HCC), Glaucoma, Gout, Gout, Hard of hearing, Hearing loss, Heart disease, History of MI (myocardial infarction), History of type 2 diabetes mellitus, Hyperlipidemia, Hypertension, Myocardial infarction (CMS/HCC), and Obesity.    No Known Allergies    Current Outpatient Medications:   •  allopurinol (ZYLOPRIM) 100 MG tablet, Take 3 tablets by mouth 2 (Two) Times a Day. MUST MAKE APPT PRIOR TO ADDITIONAL REFILLS BEING AUTHORIZED. (Patient taking differently: Take 300 mg by mouth 2 (Two) Times a Day.), Disp: 180 tablet, Rfl: 0  •  amLODIPine (NORVASC) 10 MG tablet, TAKE 1 TABLET BY MOUTH EVERY DAY, Disp: 90 tablet, Rfl: 0  •  aspirin (ADULT ASPIRIN EC LOW STRENGTH) 81 MG EC tablet, Take 1 tablet by mouth Daily., Disp: , Rfl:   •  cholecalciferol (VITAMIN D3) 25 MCG (1000 UT) tablet, TAKE 1 TABLET BY MOUTH EVERY DAY, Disp: 90 tablet, Rfl: 0  •  cloNIDine (CATAPRES) 0.1 MG tablet, Take 1 tablet by mouth 3 (Three) Times a Day. MUST MAKE APPT PRIOR TO ADDITIONAL REFILLS BEING AUTHORIZED. (Patient taking differently: Take 0.1 mg by mouth 3 (Three) Times a Day.), Disp: 90 tablet, Rfl: 0  •  dorzolamide-timolol (COSOPT) 22.3-6.8 MG/ML ophthalmic solution, 1 drop 2 (Two) Times a Day., Disp: , Rfl: 5  •  FREESTYLE LITE  "test strip, USE TO TEST TWICE A DAY, Disp: 200 each, Rfl: 3  •  hydrALAZINE (APRESOLINE) 100 MG tablet, Take 1 tablet by mouth Every 8 (Eight) Hours., Disp: 270 tablet, Rfl: 0  •  Insulin Glargine (BASAGLAR KWIKPEN) 100 UNIT/ML injection pen, Inject 53 Units under the skin into the appropriate area as directed Daily. Take 48 units daily (Patient taking differently: Inject 53 Units under the skin into the appropriate area as directed Daily.), Disp: 9 pen, Rfl: 4  •  Insulin Pen Needle (PEN NEEDLES) 32G X 4 MM misc, Inject 1 Units under the skin into the appropriate area as directed 4 (Four) Times a Day. DX:E11.9, Disp: 100 each, Rfl: 2  •  Insulin Syringe 31G X 5/16\" 1 ML misc, Inject 1 syringe under the skin into the appropriate area as directed Daily. DX:E11.9, Disp: 90 each, Rfl: 0  •  latanoprost (XALATAN) 0.005 % ophthalmic solution, INSTILL 1 DROP INTO BOTH EYES EVERY DAY IN THE EVENING, Disp: , Rfl: 3  •  losartan (COZAAR) 100 MG tablet, Take 1 tablet by mouth Daily. MUST SCHEDULE APPT FOR FURTHER REFILLS! (Patient taking differently: Take 100 mg by mouth Daily.), Disp: 30 tablet, Rfl: 0  •  OZEMPIC, 0.25 OR 0.5 MG/DOSE, 2 MG/1.5ML solution pen-injector, INJECT 0.5 MG SUB-Q ONCE A WEEK, Disp: 2 pen, Rfl: 2  •  rosuvastatin (CRESTOR) 10 MG tablet, Take 1 tablet by mouth Every Night., Disp: 90 tablet, Rfl: 0  •  timolol (TIMOPTIC) 0.5 % ophthalmic solution, Apply  to eye(s) as directed by provider., Disp: , Rfl:   •  TRADJENTA 5 MG tablet tablet, TAKE 1 TABLET BY MOUTH EVERY DAY, Disp: 90 tablet, Rfl: 0  •  Continuous Blood Gluc  (FREESTYLE ABIMAEL 14 DAY READER) device, 1 Device 2 (two) times a day. Use twice daily to check blood glucose levels. DX: E11.9, Disp: 1 Device, Rfl: 0  •  Continuous Blood Gluc Sensor (FREESTYLE ABIMAEL 14 DAY SENSOR) misc, 1 each by Other route Every 14 (Fourteen) Days. Apply 1 sensor every 14 days to monitor blood glucose. DX: E11.9, Disp: 45 each, Rfl: 1  Past Medical History: "   Diagnosis Date   • CKD (chronic kidney disease)    • Diabetes mellitus (CMS/HCC)    • Glaucoma    • Gout    • Gout    • Hard of hearing    • Hearing loss    • Heart disease    • History of MI (myocardial infarction)    • History of type 2 diabetes mellitus    • Hyperlipidemia    • Hypertension    • Myocardial infarction (CMS/HCC)    • Obesity      Past Surgical History:   Procedure Laterality Date   • CATARACT EXTRACTION Bilateral 05/2015   • OTHER SURGICAL HISTORY  12/2014    [facility] HEART STENT     Social History     Socioeconomic History   • Marital status: Single     Spouse name: Not on file   • Number of children: Not on file   • Years of education: Not on file   • Highest education level: Not on file   Tobacco Use   • Smoking status: Never Smoker   • Smokeless tobacco: Never Used   Substance and Sexual Activity   • Alcohol use: No     Frequency: Never   • Drug use: No     Family History   Problem Relation Age of Onset   • Heart disease Father    • Other Father         HEART ATTACK IN 2008   • Arthritis Maternal Grandmother    • Diabetes Maternal Grandfather    • Heart disease Maternal Grandfather    • Hypertension Maternal Grandfather    • Diabetes Paternal Grandfather    • Hyperlipidemia Paternal Grandfather    • Hypertension Paternal Grandfather      PHQ-2 Depression Screening  Little interest or pleasure in doing things? 0   Feeling down, depressed, or hopeless? 0   PHQ-2 Total Score 0     PHQ-9 Depression Screening  Little interest or pleasure in doing things? 0   Feeling down, depressed, or hopeless? 0   Trouble falling or staying asleep, or sleeping too much?     Feeling tired or having little energy?     Poor appetite or overeating?     Feeling bad about yourself - or that you are a failure or have let yourself or your family down?     Trouble concentrating on things, such as reading the newspaper or watching television?     Moving or speaking so slowly that other people could have noticed?  "Or the opposite - being so fidgety or restless that you have been moving around a lot more than usual?     Thoughts that you would be better off dead, or of hurting yourself in some way?     PHQ-9 Total Score 0   If you checked off any problems, how difficult have these problems made it for you to do your work, take care of things at home, or get along with other people?         Family history, surgical history, past medical history, Allergies and med's reviewed with patient today and updated in [email or web address] EMR.     ROS:  Review of Systems   All other systems reviewed and are negative.      OBJECTIVE:  Vitals:    06/22/20 0829   BP: 173/93   BP Location: Left arm   Patient Position: Sitting   Pulse: 91   Resp: 20   Temp: 98.1 °F (36.7 °C)   TempSrc: Oral   SpO2: 97%   Weight: 118 kg (260 lb 6.4 oz)   Height: 165.1 cm (65\")     Body mass index is 43.33 kg/m².  Physical Exam   Constitutional: He is oriented to person, place, and time. He appears well-developed and well-nourished.   HENT:   Head: Normocephalic.   Eyes: Pupils are equal, round, and reactive to light. EOM are normal.   Neck: Normal range of motion. Neck supple.   Cardiovascular: Normal rate.   Pulmonary/Chest: Effort normal and breath sounds normal.   Abdominal: Soft. Bowel sounds are normal.   Musculoskeletal: Normal range of motion.   Neurological: He is alert and oriented to person, place, and time.   Skin: Skin is warm and dry.   Psychiatric: He has a normal mood and affect. His behavior is normal. Judgment and thought content normal.   Nursing note and vitals reviewed.      ASSESSMENT/ PLAN:    Sam was seen today for diabetes, hypertension and hyperlipidemia.    Diagnoses and all orders for this visit:    Type 2 diabetes mellitus without complication, with long-term current use of insulin (CMS/[facility])  -     POCT glycated hemoglobin, total  -     Ambulatory Referral to Podiatry  -     Continuous Blood Gluc Sensor (FREESTYLE ABIMAEL 14 DAY SENSOR) misc; 1 " each by Other route Every 14 (Fourteen) Days. Apply 1 sensor every 14 days to monitor blood glucose. DX: E11.9  -     Continuous Blood Gluc  (FREESTYLE ABIMAEL 14 DAY READER) device; 1 Device 2 (two) times a day. Use twice daily to check blood glucose levels. DX: E11.9    Mixed hyperlipidemia  -     Lipid Panel; Future  -     Lipid Panel  -     LDL Cholesterol, Direct; Future  -     LDL Cholesterol, Direct    Vitamin B12 deficiency  -     Vitamin B12; Future  -     Vitamin B12    Vitamin D deficiency  -     Vitamin D 25 Hydroxy; Future  -     Vitamin D 25 Hydroxy    CKD (chronic kidney disease), stage III (CMS/HCC)  -     Comprehensive Metabolic Panel; Future  -     CBC & Differential; Future  -     Comprehensive Metabolic Panel  -     CBC & Differential  -     CBC Auto Differential    Diabetes 1.5, managed as type 2 (CMS/[facility])  -     Comprehensive Metabolic Panel; Future  -     MicroAlbumin, Urine, Random - Urine, Clean Catch; Future  -     Comprehensive Metabolic Panel  -     MicroAlbumin, Urine, Random - Urine, Clean Catch    Hypothyroidism, unspecified type  -     TSH; Future  -     T4, Free; Future  -     TSH  -     T4, Free    Screening PSA (prostate specific antigen)  -     PSA SCREENING; Future  -     PSA SCREENING        Orders Placed Today:     New Medications Ordered This Visit   Medications   • Continuous Blood Gluc Sensor (FREESTYLE ABIMAEL 14 DAY SENSOR) misc     Si each by Other route Every 14 (Fourteen) Days. Apply 1 sensor every 14 days to monitor blood glucose. DX: E11.9     Dispense:  45 each     Refill:  1   • Continuous Blood Gluc  (FREESTYLE ABIMAEL 14 DAY READER) device     Si Device 2 (two) times a day. Use twice daily to check blood glucose levels. DX: E11.9     Dispense:  1 Device     Refill:  0        Management Plan:     An After Visit Summary was printed and given to the patient at discharge.    Follow-up: Return in about 3 months (around 2020).    Marni Jaimes,  APRN 6/23/2020 15:56  This note was electronically signed.

## 2020-06-23 LAB
25(OH)D3 SERPL-MCNC: 23.1 NG/ML (ref 30–100)
ALBUMIN SERPL-MCNC: 4.5 G/DL (ref 3.5–5.2)
ALBUMIN UR-MCNC: 352.8 MG/DL
ALBUMIN/GLOB SERPL: 1.4 G/DL
ALP SERPL-CCNC: 88 U/L (ref 39–117)
ALT SERPL W P-5'-P-CCNC: 32 U/L (ref 1–41)
ANION GAP SERPL CALCULATED.3IONS-SCNC: 12.8 MMOL/L (ref 5–15)
ARTICHOKE IGE QN: 66 MG/DL (ref 0–100)
AST SERPL-CCNC: 22 U/L (ref 1–40)
BASOPHILS # BLD AUTO: 0.04 10*3/MM3 (ref 0–0.2)
BASOPHILS NFR BLD AUTO: 0.4 % (ref 0–1.5)
BILIRUB SERPL-MCNC: 0.5 MG/DL (ref 0.2–1.2)
BUN BLD-MCNC: 27 MG/DL (ref 6–20)
BUN/CREAT SERPL: 14.5 (ref 7–25)
CALCIUM SPEC-SCNC: 9.4 MG/DL (ref 8.6–10.5)
CHLORIDE SERPL-SCNC: 103 MMOL/L (ref 98–107)
CHOLEST SERPL-MCNC: 169 MG/DL (ref 0–200)
CO2 SERPL-SCNC: 22.2 MMOL/L (ref 22–29)
CREAT BLD-MCNC: 1.86 MG/DL (ref 0.76–1.27)
DEPRECATED RDW RBC AUTO: 45.1 FL (ref 37–54)
EOSINOPHIL # BLD AUTO: 0.26 10*3/MM3 (ref 0–0.4)
EOSINOPHIL NFR BLD AUTO: 2.8 % (ref 0.3–6.2)
ERYTHROCYTE [DISTWIDTH] IN BLOOD BY AUTOMATED COUNT: 14 % (ref 12.3–15.4)
GFR SERPL CREATININE-BSD FRML MDRD: 39 ML/MIN/1.73
GLOBULIN UR ELPH-MCNC: 3.2 GM/DL
GLUCOSE BLD-MCNC: 207 MG/DL (ref 65–99)
HCT VFR BLD AUTO: 45.6 % (ref 37.5–51)
HDLC SERPL-MCNC: 33 MG/DL (ref 40–60)
HGB BLD-MCNC: 15.2 G/DL (ref 13–17.7)
IMM GRANULOCYTES # BLD AUTO: 0.02 10*3/MM3 (ref 0–0.05)
IMM GRANULOCYTES NFR BLD AUTO: 0.2 % (ref 0–0.5)
LDLC SERPL CALC-MCNC: ABNORMAL MG/DL
LDLC/HDLC SERPL: ABNORMAL {RATIO}
LYMPHOCYTES # BLD AUTO: 2.11 10*3/MM3 (ref 0.7–3.1)
LYMPHOCYTES NFR BLD AUTO: 22.5 % (ref 19.6–45.3)
MCH RBC QN AUTO: 29.5 PG (ref 26.6–33)
MCHC RBC AUTO-ENTMCNC: 33.3 G/DL (ref 31.5–35.7)
MCV RBC AUTO: 88.5 FL (ref 79–97)
MONOCYTES # BLD AUTO: 0.64 10*3/MM3 (ref 0.1–0.9)
MONOCYTES NFR BLD AUTO: 6.8 % (ref 5–12)
NEUTROPHILS # BLD AUTO: 6.32 10*3/MM3 (ref 1.7–7)
NEUTROPHILS NFR BLD AUTO: 67.3 % (ref 42.7–76)
NRBC BLD AUTO-RTO: 0 /100 WBC (ref 0–0.2)
PLATELET # BLD AUTO: 210 10*3/MM3 (ref 140–450)
PMV BLD AUTO: 12.6 FL (ref 6–12)
POTASSIUM BLD-SCNC: 4.8 MMOL/L (ref 3.5–5.2)
PROT SERPL-MCNC: 7.7 G/DL (ref 6–8.5)
PSA SERPL-MCNC: 0.58 NG/ML (ref 0–4)
RBC # BLD AUTO: 5.15 10*6/MM3 (ref 4.14–5.8)
SODIUM BLD-SCNC: 138 MMOL/L (ref 136–145)
T4 FREE SERPL-MCNC: 1.12 NG/DL (ref 0.93–1.7)
TRIGL SERPL-MCNC: 441 MG/DL (ref 0–150)
TSH SERPL DL<=0.05 MIU/L-ACNC: 2.67 UIU/ML (ref 0.27–4.2)
VIT B12 BLD-MCNC: 745 PG/ML (ref 211–946)
VLDLC SERPL-MCNC: ABNORMAL MG/DL
WBC NRBC COR # BLD: 9.39 10*3/MM3 (ref 3.4–10.8)

## 2020-06-23 RX ORDER — FLASH GLUCOSE SENSOR
1 KIT MISCELLANEOUS
Qty: 45 EACH | Refills: 1 | Status: SHIPPED | OUTPATIENT
Start: 2020-06-23 | End: 2020-06-26 | Stop reason: CLARIF

## 2020-06-23 RX ORDER — FLASH GLUCOSE SCANNING READER
1 EACH MISCELLANEOUS 2 TIMES DAILY
Qty: 1 DEVICE | Refills: 0 | Status: SHIPPED | OUTPATIENT
Start: 2020-06-23 | End: 2020-06-26 | Stop reason: CLARIF

## 2020-06-26 ENCOUNTER — TELEPHONE (OUTPATIENT)
Dept: FAMILY MEDICINE CLINIC | Facility: CLINIC | Age: 50
End: 2020-06-26

## 2020-06-26 DIAGNOSIS — E11.9 TYPE 2 DIABETES MELLITUS WITHOUT COMPLICATION, WITH LONG-TERM CURRENT USE OF INSULIN: Primary | ICD-10-CM

## 2020-06-26 DIAGNOSIS — E11.8 DISORDER ASSOCIATED WITH TYPE 2 DIABETES MELLITUS: ICD-10-CM

## 2020-06-26 DIAGNOSIS — Z79.4 TYPE 2 DIABETES MELLITUS WITHOUT COMPLICATION, WITH LONG-TERM CURRENT USE OF INSULIN: Primary | ICD-10-CM

## 2020-06-26 RX ORDER — LANCETS
EACH MISCELLANEOUS
Qty: 102 EACH | Refills: 1 | Status: SHIPPED | OUTPATIENT
Start: 2020-06-26 | End: 2020-07-01

## 2020-06-26 RX ORDER — BLOOD-GLUCOSE METER
1 EACH MISCELLANEOUS 2 TIMES DAILY
Qty: 1 KIT | Refills: 0 | Status: SHIPPED | OUTPATIENT
Start: 2020-06-26 | End: 2020-07-01

## 2020-06-26 NOTE — TELEPHONE ENCOUNTER
PATIENT'S MOTHER CALLING ABOUT FREESTYLE METER STATING A PA IS NEEDED AND WOULD LIKE A CALL BACK REGARDING A1C LAB RESULTS    PLEASE ADVISE    [phone] (H)

## 2020-06-26 NOTE — TELEPHONE ENCOUNTER
Returned patient mother's phone call. Patient's identity verified. Advised her that PA was indeed denied for the Freestyle Gerson [place] and Sensor System and that an Accu chek guide glucometer and appropriate supplies would be sent as well. A1c was completed in office that day and reading was gave to mother. Advised her that provider had not yet signed off on his labs, but as soon as she did, she would be notified. Mother voiced understanding.    Marni,  Please advise on lab results. Thanks.

## 2020-07-01 ENCOUNTER — TELEPHONE (OUTPATIENT)
Dept: FAMILY MEDICINE CLINIC | Facility: CLINIC | Age: 50
End: 2020-07-01

## 2020-07-01 DIAGNOSIS — Z79.4 TYPE 2 DIABETES MELLITUS WITHOUT COMPLICATION, WITH LONG-TERM CURRENT USE OF INSULIN (HCC): Primary | ICD-10-CM

## 2020-07-01 DIAGNOSIS — E11.9 TYPE 2 DIABETES MELLITUS WITHOUT COMPLICATION, WITH LONG-TERM CURRENT USE OF INSULIN (HCC): Primary | ICD-10-CM

## 2020-07-01 RX ORDER — BLOOD-GLUCOSE METER
1 KIT MISCELLANEOUS 2 TIMES DAILY
Qty: 1 EACH | Refills: 0 | Status: SHIPPED | OUTPATIENT
Start: 2020-07-01 | End: 2020-07-01

## 2020-07-01 RX ORDER — BLOOD-GLUCOSE METER
1 KIT MISCELLANEOUS 2 TIMES DAILY
Qty: 1 EACH | Refills: 0 | Status: SHIPPED | OUTPATIENT
Start: 2020-07-01

## 2020-07-01 RX ORDER — LANCETS 28 GAUGE
1 EACH MISCELLANEOUS 2 TIMES DAILY
Qty: 100 EACH | Refills: 3 | Status: SHIPPED | OUTPATIENT
Start: 2020-07-01

## 2020-07-01 RX ORDER — LANCETS 28 GAUGE
1 EACH MISCELLANEOUS 2 TIMES DAILY
COMMUNITY
End: 2020-07-01 | Stop reason: SDUPTHER

## 2020-07-01 RX ORDER — BLOOD-GLUCOSE METER
1 KIT MISCELLANEOUS 2 TIMES DAILY
COMMUNITY
End: 2020-07-01 | Stop reason: SDUPTHER

## 2020-07-01 NOTE — TELEPHONE ENCOUNTER
Date of last refill: 6-4-22020    Is there an Inspect on file: n/a    Last appt date: 6-     Next appt date: 9-      Additional information:

## 2020-07-01 NOTE — TELEPHONE ENCOUNTER
PT SAW WENDY AND SHE PRESCRIBED HIM A 14 DAY METER TO CHECK HIS BLOOD SUGAR BUT WHAT HE REALLY NEEDS IS AN RX FOR THE FREESTYLE FREEDOM LIGHT METER AND TEST STRIPS    [facility] [address]

## 2020-07-02 RX ORDER — LOSARTAN POTASSIUM 100 MG/1
100 TABLET ORAL DAILY
Qty: 90 TABLET | Refills: 0 | Status: SHIPPED | OUTPATIENT
Start: 2020-07-02 | End: 2020-09-30

## 2020-07-02 RX ORDER — ALLOPURINOL 100 MG/1
300 TABLET ORAL 2 TIMES DAILY
Qty: 180 TABLET | Refills: 0 | Status: SHIPPED | OUTPATIENT
Start: 2020-07-02 | End: 2020-08-04

## 2020-07-02 NOTE — TELEPHONE ENCOUNTER
Date of last refill: 6-8-2020    Is there an Inspect on file: n/a    Last appt date: 6-     Next appt date: 9-      Additional information:

## 2020-07-10 NOTE — TELEPHONE ENCOUNTER
Date of last refill: 4-  Is there an Inspect on file: n/a    Last appt date: 6-     Next appt date: 9-      Additional information:

## 2020-07-13 RX ORDER — AMLODIPINE BESYLATE 10 MG/1
TABLET ORAL
Qty: 90 TABLET | Refills: 0 | Status: SHIPPED | OUTPATIENT
Start: 2020-07-13 | End: 2020-10-08

## 2020-07-22 DIAGNOSIS — E13.9 DIABETES 1.5, MANAGED AS TYPE 2 (HCC): ICD-10-CM

## 2020-07-22 RX ORDER — INSULIN GLARGINE 100 [IU]/ML
53 INJECTION, SOLUTION SUBCUTANEOUS DAILY
Qty: 9 PEN | Refills: 4 | Status: SHIPPED | OUTPATIENT
Start: 2020-07-22 | End: 2020-09-08 | Stop reason: SDUPTHER

## 2020-07-22 NOTE — TELEPHONE ENCOUNTER
Date of last refill: 06/08/2020    Is there an Inspect on file: N/A    Last appt date: 06/22/2020     Next appt date: 09/22/2020      Additional information:

## 2020-07-27 RX ORDER — ROSUVASTATIN CALCIUM 10 MG/1
TABLET, COATED ORAL
Qty: 90 TABLET | Refills: 0 | Status: SHIPPED | OUTPATIENT
Start: 2020-07-27 | End: 2020-10-20

## 2020-07-27 NOTE — TELEPHONE ENCOUNTER
Date of last refill: 4-    Is there an Inspect on file:    Last appt date: 6-     Next appt date: 9-      Additional information:

## 2020-07-29 RX ORDER — MELATONIN
Qty: 30 TABLET | Refills: 2 | Status: SHIPPED | OUTPATIENT
Start: 2020-07-29 | End: 2020-09-15 | Stop reason: SDUPTHER

## 2020-08-04 RX ORDER — LINAGLIPTIN 5 MG/1
TABLET, FILM COATED ORAL
Qty: 90 TABLET | Refills: 0 | Status: SHIPPED | OUTPATIENT
Start: 2020-08-04 | End: 2020-10-28

## 2020-08-04 RX ORDER — ALLOPURINOL 100 MG/1
300 TABLET ORAL 2 TIMES DAILY
Qty: 180 TABLET | Refills: 0 | Status: SHIPPED | OUTPATIENT
Start: 2020-08-04 | End: 2020-08-27

## 2020-08-20 RX ORDER — CLONIDINE HYDROCHLORIDE 0.1 MG/1
0.1 TABLET ORAL 3 TIMES DAILY
Qty: 90 TABLET | Refills: 0 | Status: SHIPPED | OUTPATIENT
Start: 2020-08-20 | End: 2020-09-15 | Stop reason: SDUPTHER

## 2020-08-20 NOTE — TELEPHONE ENCOUNTER
Date of last refill: 6-4-2020    Is there an Inspect on file: N/A    Last appt date: 6-     Next appt date: 9-      Additional information:

## 2020-08-25 ENCOUNTER — OFFICE VISIT (OUTPATIENT)
Dept: FAMILY MEDICINE CLINIC | Facility: CLINIC | Age: 50
End: 2020-08-25

## 2020-08-25 VITALS
SYSTOLIC BLOOD PRESSURE: 178 MMHG | DIASTOLIC BLOOD PRESSURE: 95 MMHG | WEIGHT: 260.2 LBS | OXYGEN SATURATION: 99 % | TEMPERATURE: 99.6 F | HEART RATE: 95 BPM | RESPIRATION RATE: 20 BRPM | BODY MASS INDEX: 43.35 KG/M2 | HEIGHT: 65 IN

## 2020-08-25 DIAGNOSIS — E11.65 UNCONTROLLED TYPE 2 DIABETES MELLITUS WITH HYPERGLYCEMIA (HCC): ICD-10-CM

## 2020-08-25 DIAGNOSIS — E79.0 HYPERURICEMIA: ICD-10-CM

## 2020-08-25 DIAGNOSIS — T31.0 BURN (ANY DEGREE) INVOLVING LESS THAN 10% OF BODY SURFACE: ICD-10-CM

## 2020-08-25 DIAGNOSIS — N18.30 CKD (CHRONIC KIDNEY DISEASE), STAGE III (HCC): ICD-10-CM

## 2020-08-25 DIAGNOSIS — E13.9 DIABETES 1.5, MANAGED AS TYPE 2 (HCC): Primary | ICD-10-CM

## 2020-08-25 DIAGNOSIS — I15.0 RENOVASCULAR HYPERTENSION: ICD-10-CM

## 2020-08-25 LAB — GLUCOSE BLDC GLUCOMTR-MCNC: 281 MG/DL (ref 70–130)

## 2020-08-25 PROCEDURE — 80048 BASIC METABOLIC PNL TOTAL CA: CPT | Performed by: FAMILY MEDICINE

## 2020-08-25 PROCEDURE — 84550 ASSAY OF BLOOD/URIC ACID: CPT | Performed by: FAMILY MEDICINE

## 2020-08-25 PROCEDURE — 99214 OFFICE O/P EST MOD 30 MIN: CPT | Performed by: FAMILY MEDICINE

## 2020-08-25 PROCEDURE — 82962 GLUCOSE BLOOD TEST: CPT | Performed by: FAMILY MEDICINE
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
## 2020-08-25 NOTE — PATIENT INSTRUCTIONS
Monitor blood glucose  Increase ozempic to o.t mg weekly  Increase Basaglar to 55 units daily  Clean wunds 2x a day and appy silvadene  Fu 1 week  For blood pressure increase Clonidine to 2 tablets 2 a day

## 2020-08-26 LAB
ANION GAP SERPL CALCULATED.3IONS-SCNC: 12.6 MMOL/L (ref 5–15)
BUN SERPL-MCNC: 29 MG/DL (ref 6–20)
BUN/CREAT SERPL: 15.3 (ref 7–25)
CALCIUM SPEC-SCNC: 10 MG/DL (ref 8.6–10.5)
CHLORIDE SERPL-SCNC: 98 MMOL/L (ref 98–107)
CO2 SERPL-SCNC: 26.4 MMOL/L (ref 22–29)
CREAT SERPL-MCNC: 1.9 MG/DL (ref 0.76–1.27)
GFR SERPL CREATININE-BSD FRML MDRD: 38 ML/MIN/1.73
GLUCOSE SERPL-MCNC: 303 MG/DL (ref 65–99)
POTASSIUM SERPL-SCNC: 4.9 MMOL/L (ref 3.5–5.2)
SODIUM SERPL-SCNC: 137 MMOL/L (ref 136–145)
URATE SERPL-MCNC: 4.9 MG/DL (ref 3.4–7)

## 2020-08-27 RX ORDER — ALLOPURINOL 100 MG/1
300 TABLET ORAL 2 TIMES DAILY
Qty: 180 TABLET | Refills: 0 | Status: SHIPPED | OUTPATIENT
Start: 2020-08-27 | End: 2020-09-29

## 2020-08-27 NOTE — TELEPHONE ENCOUNTER
Date of last refill: 8-4-2020    Is there an Inspect on file: n/a    Last appt date: 8-     Next appt date: 9-1-2020      Additional information:

## 2020-08-31 RX ORDER — HYDRALAZINE HYDROCHLORIDE 100 MG/1
100 TABLET, FILM COATED ORAL EVERY 8 HOURS
Qty: 270 TABLET | Refills: 0 | Status: SHIPPED | OUTPATIENT
Start: 2020-08-31 | End: 2020-12-21

## 2020-08-31 NOTE — TELEPHONE ENCOUNTER
Date of last refill: 3-    Is there an Inspect on file: n/a    Last appt date: 8-     Next appt date: 9-1-2020      Additional information:

## 2020-09-08 ENCOUNTER — OFFICE VISIT (OUTPATIENT)
Dept: FAMILY MEDICINE CLINIC | Facility: CLINIC | Age: 50
End: 2020-09-08

## 2020-09-08 VITALS
HEIGHT: 65 IN | DIASTOLIC BLOOD PRESSURE: 87 MMHG | HEART RATE: 90 BPM | BODY MASS INDEX: 43.75 KG/M2 | OXYGEN SATURATION: 100 % | TEMPERATURE: 98.2 F | WEIGHT: 262.6 LBS | SYSTOLIC BLOOD PRESSURE: 153 MMHG

## 2020-09-08 DIAGNOSIS — E66.01 MORBID (SEVERE) OBESITY DUE TO EXCESS CALORIES (HCC): ICD-10-CM

## 2020-09-08 DIAGNOSIS — I10 HTN, GOAL BELOW 140/90: ICD-10-CM

## 2020-09-08 DIAGNOSIS — L02.619 CELLULITIS AND ABSCESS OF FOOT: ICD-10-CM

## 2020-09-08 DIAGNOSIS — Z79.4 TYPE 2 DIABETES MELLITUS WITH HYPERGLYCEMIA, WITH LONG-TERM CURRENT USE OF INSULIN (HCC): Primary | ICD-10-CM

## 2020-09-08 DIAGNOSIS — E11.65 TYPE 2 DIABETES MELLITUS WITH HYPERGLYCEMIA, WITH LONG-TERM CURRENT USE OF INSULIN (HCC): Primary | ICD-10-CM

## 2020-09-08 DIAGNOSIS — L03.119 CELLULITIS AND ABSCESS OF FOOT: ICD-10-CM

## 2020-09-08 DIAGNOSIS — N18.30 CKD (CHRONIC KIDNEY DISEASE) STAGE 3, GFR 30-59 ML/MIN (HCC): ICD-10-CM

## 2020-09-08 LAB — GLUCOSE BLDC GLUCOMTR-MCNC: 302 MG/DL (ref 70–130)

## 2020-09-08 PROCEDURE — 99214 OFFICE O/P EST MOD 30 MIN: CPT | Performed by: FAMILY MEDICINE

## 2020-09-08 PROCEDURE — 82962 GLUCOSE BLOOD TEST: CPT | Performed by: FAMILY MEDICINE

## 2020-09-08 RX ORDER — INSULIN GLARGINE 100 [IU]/ML
60 INJECTION, SOLUTION SUBCUTANEOUS DAILY
Qty: 10 PEN | Refills: 4 | Status: SHIPPED | OUTPATIENT
Start: 2020-09-08 | End: 2020-09-15 | Stop reason: SDUPTHER

## 2020-09-08 RX ORDER — DOXYCYCLINE HYCLATE 100 MG
100 TABLET ORAL 2 TIMES DAILY
Qty: 20 TABLET | Refills: 0 | Status: SHIPPED | OUTPATIENT
Start: 2020-09-08 | End: 2020-09-18

## 2020-09-08 RX ORDER — CLONIDINE HYDROCHLORIDE 0.1 MG/1
0.1 TABLET ORAL EVERY 12 HOURS SCHEDULED
Status: DISCONTINUED | OUTPATIENT
Start: 2020-09-08 | End: 2021-03-03

## 2020-09-08 RX ORDER — BLOOD-GLUCOSE METER
KIT MISCELLANEOUS 2 TIMES DAILY
COMMUNITY
Start: 2020-07-01

## 2020-09-08 NOTE — PATIENT INSTRUCTIONS
Increase Basaglar to 60 units daily  Continue tradjenta Ozemoic  Doxycline 100 mg 2x a day for infection  Continue woiund care daily with silvadene cream  increase clonidine 0.1 mg to 3x a day fpr blood pressure  Bring in all meds to next appoinment

## 2020-09-08 NOTE — PROGRESS NOTES
Subjective   Sam Prakash is a 50 y.o. male.     Chief Complaint   Patient presents with   • Burn     follow up on burns on feet x 6 days   • Follow-up       History of Present Illness   Fu wound, DM, HTN  Pt has resolving wound    The following portions of the patient's history were reviewed and updated as appropriate: allergies, current medications, past family history, past medical history, past social history, past surgical history and problem list.    Past Medical History:   Diagnosis Date   • CKD (chronic kidney disease)    • Diabetes mellitus (CMS/HCC)    • Glaucoma    • Gout    • Gout    • Hard of hearing    • Hearing loss    • Heart disease    • History of MI (myocardial infarction)    • History of type 2 diabetes mellitus    • Hyperlipidemia    • Hypertension    • Myocardial infarction (CMS/HCC)    • Obesity        Past Surgical History:   Procedure Laterality Date   • CATARACT EXTRACTION Bilateral 05/2015   • OTHER SURGICAL HISTORY  [place]    [facility]       Family History   Problem Relation Age of Onset   • Heart disease Father    • Other Father         HEART ATTACK IN 2008   • Arthritis Maternal Grandmother    • Diabetes Maternal Grandfather    • Heart disease Maternal Grandfather    • Hypertension Maternal Grandfather    • Diabetes Paternal Grandfather    • Hyperlipidemia Paternal Grandfather    • Hypertension Paternal Grandfather        Review of Systems   Constitutional: Negative for fatigue, unexpected weight gain and unexpected weight loss.   HENT: Negative for congestion, sinus pressure and sore throat.    Eyes: Negative for blurred vision and visual disturbance.   Respiratory: Negative for cough, shortness of breath and wheezing.    Cardiovascular: Negative for chest pain, palpitations and leg swelling.   Gastrointestinal: Negative for abdominal pain, constipation, diarrhea, nausea, vomiting, GERD and indigestion.   Musculoskeletal: Negative for arthralgias, back  pain, gait problem, joint swelling and neck pain.   Skin: Positive for skin lesions (left dorsum of foot wound). Negative for rash and bruise.   Allergic/Immunologic: Negative for environmental allergies.   Neurological: Negative for dizziness, tremors, syncope, weakness, light-headedness, headache and memory problem.   Psychiatric/Behavioral: Negative for sleep disturbance, depressed mood and stress. The patient is not nervous/anxious.        Objective   Physical Exam   Constitutional: He is oriented to person, place, and time. He appears well-developed and well-nourished. No distress. He is obese.  HENT:   Head: Normocephalic and atraumatic.   Right Ear: External ear normal.   Left Ear: External ear normal.   Nose: Nose normal.   Mouth/Throat: Oropharynx is clear and moist.   Eyes: Pupils are equal, round, and reactive to light.   Neck: Normal range of motion. Neck supple. No thyromegaly present.   Cardiovascular: Normal rate, regular rhythm and normal heart sounds. Exam reveals no gallop and no friction rub.   No murmur heard.  Pulmonary/Chest: Effort normal. He has no wheezes.   Abdominal: Soft. There is no abdominal tenderness.   Musculoskeletal: Normal range of motion. No tenderness or deformity.   Lymphadenopathy:     He has no cervical adenopathy.   Neurological: He is alert and oriented to person, place, and time. No cranial nerve deficit.   Skin: Skin is warm and dry. No rash noted. He is not diaphoretic.   Left dorsum of foot with smaller wound, with reythema   Psychiatric: His behavior is normal. Judgment and thought content normal.   Nursing note and vitals reviewed.    Vitals:    09/08/20 0830   BP: 176/91   Pulse: 90   Temp: 98.2 °F (36.8 °C)   SpO2: 100%     Body mass index is 43.7 kg/m².    Hemoglobin A1C   Date Value Ref Range Status   06/22/2020 7.7 % Final   10/30/2019 8.1 % Final   07/30/2019 7.4 % Final     Glucose   Date Value Ref Range Status   09/08/2020 302 (A) 70 - 130 mg/dL Final    08/25/2020 281 (A) 70 - 130 mg/dL Final     Comment:     breakfast of oats around 0700 this morning    10/30/2019 158 (A) 70 - 130 mg/dL Final     LDL Cholesterol    Date Value Ref Range Status   06/22/2020   Final     Comment:     Unable to calculate   06/22/2020 66 0 - 100 mg/dL Final   10/30/2019 72 0 - 100 mg/dL Final   11/06/2018 76 0 - 100 mg/dL Final     TSH   Date Value Ref Range Status   06/22/2020 2.670 0.270 - 4.200 uIU/mL Final   10/30/2019 3.890 0.270 - 4.200 uIU/mL Final     Results for orders placed or performed in visit on 09/08/20   POC Glucose   Result Value Ref Range    Glucose 302 (A) 70 - 130 mg/dL   Results for orders placed or performed in visit on 08/25/20   POCT Glucose   Result Value Ref Range    Glucose 281 (A) 70 - 130 mg/dL   Uric acid   Result Value Ref Range    Uric Acid 4.9 3.4 - 7.0 mg/dL   Basic metabolic panel   Result Value Ref Range    Glucose 303 (H) 65 - 99 mg/dL    BUN 29 (H) 6 - 20 mg/dL    Creatinine 1.90 (H) 0.76 - 1.27 mg/dL    Sodium 137 136 - 145 mmol/L    Potassium 4.9 3.5 - 5.2 mmol/L    Chloride 98 98 - 107 mmol/L    CO2 26.4 22.0 - 29.0 mmol/L    Calcium 10.0 8.6 - 10.5 mg/dL    eGFR Non African Amer 38 (L) >60 mL/min/1.73    BUN/Creatinine Ratio 15.3 7.0 - 25.0    Anion Gap 12.6 5.0 - 15.0 mmol/L   Results for orders placed or performed in visit on 06/22/20   PSA SCREENING   Result Value Ref Range    PSA 0.579 0.000 - 4.000 ng/mL   CBC Auto Differential   Result Value Ref Range    WBC 9.39 3.40 - 10.80 10*3/mm3    RBC 5.15 4.14 - 5.80 10*6/mm3    Hemoglobin 15.2 13.0 - 17.7 g/dL    Hematocrit 45.6 37.5 - 51.0 %    MCV 88.5 79.0 - 97.0 fL    MCH 29.5 26.6 - 33.0 pg    MCHC 33.3 31.5 - 35.7 g/dL    RDW 14.0 12.3 - 15.4 %    RDW-SD 45.1 37.0 - 54.0 fl    MPV 12.6 (H) 6.0 - 12.0 fL    Platelets 210 140 - 450 10*3/mm3    Neutrophil % 67.3 42.7 - 76.0 %    Lymphocyte % 22.5 19.6 - 45.3 %    Monocyte % 6.8 5.0 - 12.0 %    Eosinophil % 2.8 0.3 - 6.2 %    Basophil % 0.4  0.0 - 1.5 %    Immature Grans % 0.2 0.0 - 0.5 %    Neutrophils, Absolute 6.32 1.70 - 7.00 10*3/mm3    Lymphocytes, Absolute 2.11 0.70 - 3.10 10*3/mm3    Monocytes, Absolute 0.64 0.10 - 0.90 10*3/mm3    Eosinophils, Absolute 0.26 0.00 - 0.40 10*3/mm3    Basophils, Absolute 0.04 0.00 - 0.20 10*3/mm3    Immature Grans, Absolute 0.02 0.00 - 0.05 10*3/mm3    nRBC 0.0 0.0 - 0.2 /100 WBC   MicroAlbumin, Urine, Random - Urine, Clean Catch   Result Value Ref Range    Microalbumin, Urine 352.8 mg/dL   POCT glycated hemoglobin, total   Result Value Ref Range    Hemoglobin A1C 7.7 %    Lot Number [id number]     Expiration Date 01/13/2022    Vitamin D 25 Hydroxy   Result Value Ref Range    25 Hydroxy, Vitamin D 23.1 (L) 30.0 - 100.0 ng/ml   TSH   Result Value Ref Range    TSH 2.670 0.270 - 4.200 uIU/mL   T4, Free   Result Value Ref Range    Free T4 1.12 0.93 - 1.70 ng/dL   LDL Cholesterol, Direct   Result Value Ref Range    LDL Cholesterol  66 0 - 100 mg/dL   Vitamin B12   Result Value Ref Range    Vitamin B-12 745 211 - 946 pg/mL   Lipid Panel   Result Value Ref Range    Total Cholesterol 169 0 - 200 mg/dL    Triglycerides 441 (H) 0 - 150 mg/dL    HDL Cholesterol 33 (L) 40 - 60 mg/dL    LDL Cholesterol       VLDL Cholesterol      LDL/HDL Ratio     Comprehensive Metabolic Panel   Result Value Ref Range    Glucose 207 (H) 65 - 99 mg/dL    BUN 27 (H) 6 - 20 mg/dL    Creatinine 1.86 (H) 0.76 - 1.27 mg/dL    Sodium 138 136 - 145 mmol/L    Potassium 4.8 3.5 - 5.2 mmol/L    Chloride 103 98 - 107 mmol/L    CO2 22.2 22.0 - 29.0 mmol/L    Calcium 9.4 8.6 - 10.5 mg/dL    Total Protein 7.7 6.0 - 8.5 g/dL    Albumin 4.50 3.50 - 5.20 g/dL    ALT (SGPT) 32 1 - 41 U/L    AST (SGOT) 22 1 - 40 U/L    Alkaline Phosphatase 88 39 - 117 U/L    Total Bilirubin 0.5 0.2 - 1.2 mg/dL    eGFR Non African Amer 39 (L) >60 mL/min/1.73    Globulin 3.2 gm/dL    A/G Ratio 1.4 g/dL    BUN/Creatinine Ratio 14.5 7.0 - 25.0    Anion Gap 12.8 5.0 - 15.0 mmol/L    Results for orders placed or performed in visit on 10/30/19   CBC Auto Differential   Result Value Ref Range    WBC 9.74 3.40 - 10.80 10*3/mm3    RBC 4.91 4.14 - 5.80 10*6/mm3    Hemoglobin 14.9 13.0 - 17.7 g/dL    Hematocrit 43.6 37.5 - 51.0 %    MCV 88.8 79.0 - 97.0 fL    MCH 30.3 26.6 - 33.0 pg    MCHC 34.2 31.5 - 35.7 g/dL    RDW 14.1 12.3 - 15.4 %    RDW-SD 44.7 37.0 - 54.0 fl    MPV 12.0 6.0 - 12.0 fL    Platelets 215 140 - 450 10*3/mm3    Neutrophil % 66.3 42.7 - 76.0 %    Lymphocyte % 22.3 19.6 - 45.3 %    Monocyte % 7.0 5.0 - 12.0 %    Eosinophil % 3.4 0.3 - 6.2 %    Basophil % 0.7 0.0 - 1.5 %    Immature Grans % 0.3 0.0 - 0.5 %    Neutrophils, Absolute 6.46 1.70 - 7.00 10*3/mm3    Lymphocytes, Absolute 2.17 0.70 - 3.10 10*3/mm3    Monocytes, Absolute 0.68 0.10 - 0.90 10*3/mm3    Eosinophils, Absolute 0.33 0.00 - 0.40 10*3/mm3    Basophils, Absolute 0.07 0.00 - 0.20 10*3/mm3    Immature Grans, Absolute 0.03 0.00 - 0.05 10*3/mm3    nRBC 0.2 0.0 - 0.2 /100 WBC   MicroAlbumin, Urine, Random - Urine, Clean Catch   Result Value Ref Range    Microalbumin, Urine 218.0 mg/dL   POC Glycosylated Hemoglobin (Hb A1C)   Result Value Ref Range    Hemoglobin A1C 8.1 %   Vitamin D 25 Hydroxy   Result Value Ref Range    25 Hydroxy, Vitamin D 26.9 (L) 30.0 - 100.0 ng/ml     *Note: Due to a large number of results and/or encounters for the requested time period, some results have not been displayed. A complete set of results can be found in Results Review.       Assessment/Plan   Sam was seen today for burn and follow-up.    Diagnoses and all orders for this visit:    Type 2 diabetes mellitus with hyperglycemia, with long-term current use of insulin (CMS/[facility])  -     POC Glucose    Cellulitis and abscess of foot  -     Insulin Glargine (BASAGLAR KWIKPEN) 100 UNIT/ML injection pen; Inject 60 Units under the skin into the appropriate area as directed Daily. Take 48 units daily  -     Ambulatory Referral to  Nephrology    HTN, goal below 140/90  -     cloNIDine (CATAPRES) tablet 0.1 mg    CKD (chronic kidney disease) stage 3, GFR 30-59 ml/min (CMS/HCC)    Morbid (severe) obesity due to excess calories (CMS/[facility])    Other orders  -     doxycycline (VIBRAMYICN) 100 MG tablet; Take 1 tablet by mouth 2 (Two) Times a Day for 10 days.    continue wound care  Continue antibioitcs  Refer to nephrology for FU CKD  Monitor blood glucose  Continue meds for Diabetes control  Low carb diet

## 2020-09-09 DIAGNOSIS — E13.9 DIABETES 1.5, MANAGED AS TYPE 2 (HCC): ICD-10-CM

## 2020-09-09 RX ORDER — PEN NEEDLE, DIABETIC 30 GX3/16"
1 NEEDLE, DISPOSABLE MISCELLANEOUS 4 TIMES DAILY
Qty: 100 EACH | Refills: 2 | Status: SHIPPED | OUTPATIENT
Start: 2020-09-09 | End: 2020-09-10 | Stop reason: SDUPTHER

## 2020-09-09 RX ORDER — SEMAGLUTIDE 1.34 MG/ML
INJECTION, SOLUTION SUBCUTANEOUS
Qty: 4 PEN | Refills: 0 | Status: SHIPPED | OUTPATIENT
Start: 2020-09-09 | End: 2020-11-24

## 2020-09-10 RX ORDER — PEN NEEDLE, DIABETIC 30 GX3/16"
1 NEEDLE, DISPOSABLE MISCELLANEOUS 4 TIMES DAILY
Qty: 100 EACH | Refills: 2 | Status: SHIPPED | OUTPATIENT
Start: 2020-09-10 | End: 2020-09-15 | Stop reason: SDUPTHER

## 2020-09-10 NOTE — TELEPHONE ENCOUNTER
Date of last refill: 08/25/2020    Is there an Inspect on file:NA     Last appt date: 09/08/2020     Next appt date: 09/15/2020      Additional information:

## 2020-09-15 ENCOUNTER — OFFICE VISIT (OUTPATIENT)
Dept: FAMILY MEDICINE CLINIC | Facility: CLINIC | Age: 50
End: 2020-09-15

## 2020-09-15 VITALS
DIASTOLIC BLOOD PRESSURE: 87 MMHG | SYSTOLIC BLOOD PRESSURE: 149 MMHG | WEIGHT: 258.8 LBS | OXYGEN SATURATION: 100 % | HEART RATE: 81 BPM | BODY MASS INDEX: 43.12 KG/M2 | HEIGHT: 65 IN | TEMPERATURE: 97.8 F | RESPIRATION RATE: 22 BRPM

## 2020-09-15 DIAGNOSIS — E11.65 TYPE 2 DIABETES MELLITUS WITH HYPERGLYCEMIA, WITH LONG-TERM CURRENT USE OF INSULIN (HCC): Primary | ICD-10-CM

## 2020-09-15 DIAGNOSIS — L03.90 WOUND CELLULITIS: ICD-10-CM

## 2020-09-15 DIAGNOSIS — N18.30 CKD (CHRONIC KIDNEY DISEASE), STAGE III (HCC): ICD-10-CM

## 2020-09-15 DIAGNOSIS — I15.0 RENOVASCULAR HYPERTENSION: ICD-10-CM

## 2020-09-15 DIAGNOSIS — L02.619 CELLULITIS AND ABSCESS OF FOOT: ICD-10-CM

## 2020-09-15 DIAGNOSIS — M79.672 FOOT PAIN, LEFT: ICD-10-CM

## 2020-09-15 DIAGNOSIS — Z79.4 TYPE 2 DIABETES MELLITUS WITH HYPERGLYCEMIA, WITH LONG-TERM CURRENT USE OF INSULIN (HCC): Primary | ICD-10-CM

## 2020-09-15 DIAGNOSIS — L03.119 CELLULITIS AND ABSCESS OF FOOT: ICD-10-CM

## 2020-09-15 PROCEDURE — 99214 OFFICE O/P EST MOD 30 MIN: CPT | Performed by: FAMILY MEDICINE

## 2020-09-15 RX ORDER — INSULIN GLARGINE 100 [IU]/ML
60 INJECTION, SOLUTION SUBCUTANEOUS DAILY
Qty: 20 PEN | Refills: 0 | Status: SHIPPED | OUTPATIENT
Start: 2020-09-15 | End: 2021-03-25 | Stop reason: SDUPTHER

## 2020-09-15 RX ORDER — INSULIN GLARGINE 100 [IU]/ML
60 INJECTION, SOLUTION SUBCUTANEOUS DAILY
Qty: 18 PEN | Refills: 0 | Status: SHIPPED | OUTPATIENT
Start: 2020-09-15 | End: 2020-09-15 | Stop reason: SDUPTHER

## 2020-09-15 RX ORDER — ACETAMINOPHEN AND CODEINE PHOSPHATE 300; 30 MG/1; MG/1
1 TABLET ORAL EVERY 4 HOURS PRN
Qty: 30 TABLET | Refills: 0 | Status: SHIPPED | OUTPATIENT
Start: 2020-09-15 | End: 2021-03-03

## 2020-09-15 RX ORDER — PEN NEEDLE, DIABETIC 30 GX3/16"
1 NEEDLE, DISPOSABLE MISCELLANEOUS 4 TIMES DAILY
Qty: 100 EACH | Refills: 2 | Status: SHIPPED | OUTPATIENT
Start: 2020-09-15 | End: 2021-03-02 | Stop reason: SDUPTHER

## 2020-09-15 RX ORDER — CLONIDINE HYDROCHLORIDE 0.1 MG/1
0.1 TABLET ORAL 3 TIMES DAILY
Qty: 90 TABLET | Refills: 0 | Status: SHIPPED | OUTPATIENT
Start: 2020-09-15 | End: 2020-09-15

## 2020-09-15 RX ORDER — CLONIDINE HYDROCHLORIDE 0.2 MG/1
0.2 TABLET ORAL 2 TIMES DAILY
Qty: 60 TABLET | Refills: 0 | Status: SHIPPED | OUTPATIENT
Start: 2020-09-15 | End: 2020-10-08

## 2020-09-15 RX ORDER — MELATONIN
1000 DAILY
Qty: 90 TABLET | Refills: 1 | Status: SHIPPED | OUTPATIENT
Start: 2020-09-15

## 2020-09-15 RX ORDER — PEN NEEDLE, DIABETIC 30 GX3/16"
1 NEEDLE, DISPOSABLE MISCELLANEOUS 2 TIMES DAILY
Qty: 200 EACH | Refills: 5 | Status: SHIPPED | OUTPATIENT
Start: 2020-09-15 | End: 2021-09-29 | Stop reason: ALTCHOICE

## 2020-09-15 NOTE — PROGRESS NOTES
Subjective   Sam Prakash is a 50 y.o. male.     Chief Complaint   Patient presents with   • Foot Injury     FOLLOW  UP BURN ON LEFT FOOT- PT STATES THAT IT SEEMS TO BE FINALLY STARTING TO CLEAR UP    • Med Refill       History of Present Illness   Dm on insulin unconttolled  Bp stil elevated  Left foot wound improved with decreased swelling and redness    The following portions of the patient's history were reviewed and updated as appropriate: allergies, current medications, past family history, past medical history, past social history, past surgical history and problem list.    Past Medical History:   Diagnosis Date   • CKD (chronic kidney disease)    • Diabetes mellitus (CMS/HCC)    • Glaucoma    • Gout    • Gout    • Hard of hearing    • Hearing loss    • Heart disease    • History of MI (myocardial infarction)    • History of type 2 diabetes mellitus    • Hyperlipidemia    • Hypertension    • Myocardial infarction (CMS/HCC)    • Obesity        Past Surgical History:   Procedure Laterality Date   • CATARACT EXTRACTION Bilateral 05/2015   • OTHER SURGICAL HISTORY  12/2014    [facility] HEART STENT       Family History   Problem Relation Age of Onset   • Heart disease Father    • Other Father         HEART ATTACK IN 2008   • Arthritis Maternal Grandmother    • Diabetes Maternal Grandfather    • Heart disease Maternal Grandfather    • Hypertension Maternal Grandfather    • Diabetes Paternal Grandfather    • Hyperlipidemia Paternal Grandfather    • Hypertension Paternal Grandfather        Review of Systems   Constitutional: Negative for fatigue, unexpected weight gain and unexpected weight loss.   HENT: Negative for congestion, sinus pressure and sore throat.    Eyes: Negative for blurred vision and visual disturbance.   Respiratory: Negative for cough, shortness of breath and wheezing.    Cardiovascular: Negative for chest pain, palpitations and leg swelling.   Gastrointestinal: Negative for  abdominal pain, constipation, diarrhea, nausea, vomiting, GERD and indigestion.   Musculoskeletal: Negative for arthralgias, back pain, gait problem, joint swelling and neck pain.   Skin: Negative for rash, skin lesions and bruise.        Left foot dorsum wound   Allergic/Immunologic: Negative for environmental allergies.   Neurological: Negative for dizziness, tremors, syncope, weakness, light-headedness, headache and memory problem.   Psychiatric/Behavioral: Negative for sleep disturbance, depressed mood and stress. The patient is not nervous/anxious.        Objective   Physical Exam  Constitutional:       Appearance: He is obese.   Skin:     Comments: Left foot improving burn wound       Vitals:    09/15/20 1009   BP: 149/87   Pulse: 81   Resp: 22   Temp: 97.8 °F (36.6 °C)   SpO2: 100%     Body mass index is 43.07 kg/m².    Hemoglobin A1C   Date Value Ref Range Status   06/22/2020 7.7 % Final   10/30/2019 8.1 % Final   07/30/2019 7.4 % Final     Glucose   Date Value Ref Range Status   09/08/2020 302 (A) 70 - 130 mg/dL Final   08/25/2020 281 (A) 70 - 130 mg/dL Final     Comment:     breakfast of oats around 0700 this morning    10/30/2019 158 (A) 70 - 130 mg/dL Final     LDL Cholesterol    Date Value Ref Range Status   06/22/2020   Final     Comment:     Unable to calculate   06/22/2020 66 0 - 100 mg/dL Final   10/30/2019 72 0 - 100 mg/dL Final   11/06/2018 76 0 - 100 mg/dL Final     TSH   Date Value Ref Range Status   06/22/2020 2.670 0.270 - 4.200 uIU/mL Final   10/30/2019 3.890 0.270 - 4.200 uIU/mL Final     Results for orders placed or performed in visit on 09/08/20   POC Glucose    Specimen: Blood   Result Value Ref Range    Glucose 302 (A) 70 - 130 mg/dL   Results for orders placed or performed in visit on 08/25/20   POCT Glucose    Specimen: Blood   Result Value Ref Range    Glucose 281 (A) 70 - 130 mg/dL   Uric acid    Specimen: Arm, Right; Blood   Result Value Ref Range    Uric Acid 4.9 3.4 - 7.0 mg/dL    Basic metabolic panel    Specimen: Arm, Right; Blood   Result Value Ref Range    Glucose 303 (H) 65 - 99 mg/dL    BUN 29 (H) 6 - 20 mg/dL    Creatinine 1.90 (H) 0.76 - 1.27 mg/dL    Sodium 137 136 - 145 mmol/L    Potassium 4.9 3.5 - 5.2 mmol/L    Chloride 98 98 - 107 mmol/L    CO2 26.4 22.0 - 29.0 mmol/L    Calcium 10.0 8.6 - 10.5 mg/dL    eGFR Non African Amer 38 (L) >60 mL/min/1.73    BUN/Creatinine Ratio 15.3 7.0 - 25.0    Anion Gap 12.6 5.0 - 15.0 mmol/L   Results for orders placed or performed in visit on 06/22/20   PSA SCREENING    Specimen: Arm, Left; Blood   Result Value Ref Range    PSA 0.579 0.000 - 4.000 ng/mL   CBC Auto Differential    Specimen: Arm, Left; Blood   Result Value Ref Range    WBC 9.39 3.40 - 10.80 10*3/mm3    RBC 5.15 4.14 - 5.80 10*6/mm3    Hemoglobin 15.2 13.0 - 17.7 g/dL    Hematocrit 45.6 37.5 - 51.0 %    MCV 88.5 79.0 - 97.0 fL    MCH 29.5 26.6 - 33.0 pg    MCHC 33.3 31.5 - 35.7 g/dL    RDW 14.0 12.3 - 15.4 %    RDW-SD 45.1 37.0 - 54.0 fl    MPV 12.6 (H) 6.0 - 12.0 fL    Platelets 210 140 - 450 10*3/mm3    Neutrophil % 67.3 42.7 - 76.0 %    Lymphocyte % 22.5 19.6 - 45.3 %    Monocyte % 6.8 5.0 - 12.0 %    Eosinophil % 2.8 0.3 - 6.2 %    Basophil % 0.4 0.0 - 1.5 %    Immature Grans % 0.2 0.0 - 0.5 %    Neutrophils, Absolute 6.32 1.70 - 7.00 10*3/mm3    Lymphocytes, Absolute 2.11 0.70 - 3.10 10*3/mm3    Monocytes, Absolute 0.64 0.10 - 0.90 10*3/mm3    Eosinophils, Absolute 0.26 0.00 - 0.40 10*3/mm3    Basophils, Absolute 0.04 0.00 - 0.20 10*3/mm3    Immature Grans, Absolute 0.02 0.00 - 0.05 10*3/mm3    nRBC 0.0 0.0 - 0.2 /100 WBC   MicroAlbumin, Urine, Random - Urine, Clean Catch    Specimen: Urine, Clean Catch   Result Value Ref Range    Microalbumin, Urine 352.8 mg/dL   POCT glycated hemoglobin, total    Specimen: Urine   Result Value Ref Range    Hemoglobin A1C 7.7 %    Lot Number [id number]     Expiration Date 01/13/2022    Vitamin D 25 Hydroxy    Specimen: Arm, Left; Blood    Result Value Ref Range    25 Hydroxy, Vitamin D 23.1 (L) 30.0 - 100.0 ng/ml   TSH    Specimen: Arm, Left; Blood   Result Value Ref Range    TSH 2.670 0.270 - 4.200 uIU/mL   T4, Free    Specimen: Arm, Left; Blood   Result Value Ref Range    Free T4 1.12 0.93 - 1.70 ng/dL   LDL Cholesterol, Direct    Specimen: Arm, Left; Blood   Result Value Ref Range    LDL Cholesterol  66 0 - 100 mg/dL   Vitamin B12    Specimen: Arm, Left; Blood   Result Value Ref Range    Vitamin B-12 745 211 - 946 pg/mL   Lipid Panel    Specimen: Arm, Left; Blood   Result Value Ref Range    Total Cholesterol 169 0 - 200 mg/dL    Triglycerides 441 (H) 0 - 150 mg/dL    HDL Cholesterol 33 (L) 40 - 60 mg/dL    LDL Cholesterol       VLDL Cholesterol      LDL/HDL Ratio     Comprehensive Metabolic Panel    Specimen: Arm, Left; Blood   Result Value Ref Range    Glucose 207 (H) 65 - 99 mg/dL    BUN 27 (H) 6 - 20 mg/dL    Creatinine 1.86 (H) 0.76 - 1.27 mg/dL    Sodium 138 136 - 145 mmol/L    Potassium 4.8 3.5 - 5.2 mmol/L    Chloride 103 98 - 107 mmol/L    CO2 22.2 22.0 - 29.0 mmol/L    Calcium 9.4 8.6 - 10.5 mg/dL    Total Protein 7.7 6.0 - 8.5 g/dL    Albumin 4.50 3.50 - 5.20 g/dL    ALT (SGPT) 32 1 - 41 U/L    AST (SGOT) 22 1 - 40 U/L    Alkaline Phosphatase 88 39 - 117 U/L    Total Bilirubin 0.5 0.2 - 1.2 mg/dL    eGFR Non African Amer 39 (L) >60 mL/min/1.73    Globulin 3.2 gm/dL    A/G Ratio 1.4 g/dL    BUN/Creatinine Ratio 14.5 7.0 - 25.0    Anion Gap 12.8 5.0 - 15.0 mmol/L   Results for orders placed or performed in visit on 10/30/19   CBC Auto Differential    Specimen: Arm, Right; Blood   Result Value Ref Range    WBC 9.74 3.40 - 10.80 10*3/mm3    RBC 4.91 4.14 - 5.80 10*6/mm3    Hemoglobin 14.9 13.0 - 17.7 g/dL    Hematocrit 43.6 37.5 - 51.0 %    MCV 88.8 79.0 - 97.0 fL    MCH 30.3 26.6 - 33.0 pg    MCHC 34.2 31.5 - 35.7 g/dL    RDW 14.1 12.3 - 15.4 %    RDW-SD 44.7 37.0 - 54.0 fl    MPV 12.0 6.0 - 12.0 fL    Platelets 215 140 - 450 10*3/mm3     Neutrophil % 66.3 42.7 - 76.0 %    Lymphocyte % 22.3 19.6 - 45.3 %    Monocyte % 7.0 5.0 - 12.0 %    Eosinophil % 3.4 0.3 - 6.2 %    Basophil % 0.7 0.0 - 1.5 %    Immature Grans % 0.3 0.0 - 0.5 %    Neutrophils, Absolute 6.46 1.70 - 7.00 10*3/mm3    Lymphocytes, Absolute 2.17 0.70 - 3.10 10*3/mm3    Monocytes, Absolute 0.68 0.10 - 0.90 10*3/mm3    Eosinophils, Absolute 0.33 0.00 - 0.40 10*3/mm3    Basophils, Absolute 0.07 0.00 - 0.20 10*3/mm3    Immature Grans, Absolute 0.03 0.00 - 0.05 10*3/mm3    nRBC 0.2 0.0 - 0.2 /100 WBC   MicroAlbumin, Urine, Random - Urine, Clean Catch    Specimen: Urine, Clean Catch   Result Value Ref Range    Microalbumin, Urine 218.0 mg/dL   POC Glycosylated Hemoglobin (Hb A1C)    Specimen: Blood   Result Value Ref Range    Hemoglobin A1C 8.1 %   Vitamin D 25 Hydroxy    Specimen: Arm, Right; Blood   Result Value Ref Range    25 Hydroxy, Vitamin D 26.9 (L) 30.0 - 100.0 ng/ml     *Note: Due to a large number of results and/or encounters for the requested time period, some results have not been displayed. A complete set of results can be found in Results Review.       Assessment/Plan   Sam was seen today for foot injury and med refill.    Diagnoses and all orders for this visit:    Type 2 diabetes mellitus with hyperglycemia, with long-term current use of insulin (CMS/[facility])  -     POCT Glucose  -     acetaminophen-codeine (TYLENOL #3) 300-30 MG per tablet; Take 1 tablet by mouth Every 4 (Four) Hours As Needed for Moderate Pain .    Cellulitis and abscess of foot  -     Discontinue: Insulin Glargine (BASAGLAR KWIKPEN) 100 UNIT/ML injection pen; Inject 60 Units under the skin into the appropriate area as directed Daily. Take 48 units daily  -     Insulin Glargine (BASAGLAR KWIKPEN) 100 UNIT/ML injection pen; Inject 60 Units under the skin into the appropriate area as directed Daily. take32 units 2x a day    CKD (chronic kidney disease), stage III (CMS/HCC)    Wound cellulitis  -      acetaminophen-codeine (TYLENOL #3) 300-30 MG per tablet; Take 1 tablet by mouth Every 4 (Four) Hours As Needed for Moderate Pain .    Renovascular hypertension    Foot pain, left  -     acetaminophen-codeine (TYLENOL #3) 300-30 MG per tablet; Take 1 tablet by mouth Every 4 (Four) Hours As Needed for Moderate Pain .    Other orders  -     Discontinue: cloNIDine (CATAPRES) 0.1 MG tablet; Take 1 tablet by mouth 3 (Three) Times a Day.  -     cholecalciferol (VITAMIN D3) 25 MCG (1000 UT) tablet; Take 1 tablet by mouth Daily.  -     cloNIDine (Catapres) 0.2 MG tablet; Take 1 tablet by mouth 2 (Two) Times a Day.  -     glucose blood test strip; Take blood glucose 3x a day  -     Insulin Pen Needle (Pen Needles) 32G X 4 MM misc; Inject 1 each under the skin into the appropriate area as directed 2 (two) times a day.  -     Insulin Pen Needle (Pen Needles) 32G X 4 MM misc; Inject 1 Units under the skin into the appropriate area as directed 4 (Four) Times a Day. DX:E11.9    continue wound care for foot  Finish antibiotocs ( doxyxycline  Increase clonidine to 02mg 2x a day  Increase Basaglar to 64 units daily  Continue Tradjenta and Ozempic  Bring in glucose readings at next appoinmtent  Take tylenol with codiene 2-3x a day as needed for pain and a regular tylenol as needed for pain

## 2020-09-17 RX ORDER — CLONIDINE HYDROCHLORIDE 0.1 MG/1
TABLET ORAL
Qty: 90 TABLET | Refills: 0 | OUTPATIENT
Start: 2020-09-17

## 2020-09-17 NOTE — TELEPHONE ENCOUNTER
Patient's mother called into office and states that the patient's foot is still not healed. Mother is wondering if you wanted to keep him on another round of ABT? ABT is loaded if so. Thanks.

## 2020-09-18 RX ORDER — DOXYCYCLINE 100 MG/1
CAPSULE ORAL
Qty: 20 CAPSULE | Refills: 0 | Status: SHIPPED | OUTPATIENT
Start: 2020-09-18 | End: 2021-03-02

## 2020-09-29 RX ORDER — ALLOPURINOL 100 MG/1
TABLET ORAL
Qty: 180 TABLET | Refills: 0 | Status: SHIPPED | OUTPATIENT
Start: 2020-09-29 | End: 2020-11-03

## 2020-09-29 RX ORDER — DOXYCYCLINE 100 MG/1
CAPSULE ORAL
Qty: 20 CAPSULE | Refills: 0 | Status: CANCELLED | OUTPATIENT
Start: 2020-09-29

## 2020-09-29 NOTE — TELEPHONE ENCOUNTER
Date of last refill:09/18/2020    Is there an Inspect on file:N/A    Last appt date:09/15/2020     Next appt date:MISSED LAST APPT      Additional information:MOM CALLED WANTING ANOTHER REFILL ON THE MED.  SAID THAT IT IS LOOKING BETTER STILL A LITTLE RED

## 2020-09-29 NOTE — TELEPHONE ENCOUNTER
Date of last refill: 8-    Is there an Inspect on file: N/A    Last appt date: 9-     Next appt date: NONE       Additional information:]

## 2020-09-30 RX ORDER — LOSARTAN POTASSIUM 100 MG/1
TABLET ORAL
Qty: 90 TABLET | Refills: 0 | Status: SHIPPED | OUTPATIENT
Start: 2020-09-30 | End: 2021-01-04

## 2020-10-06 NOTE — TELEPHONE ENCOUNTER
Called and s/w patient's mother. Advised her that pt missed his last appt. She voiced understanding. She is unaware if patient still needs ABT at this point but denies any infection that she is aware of. Offered her 2 different appt times for patient. States that she will s/w patient, and then call us back.

## 2020-10-08 RX ORDER — AMLODIPINE BESYLATE 10 MG/1
TABLET ORAL
Qty: 90 TABLET | Refills: 0 | Status: SHIPPED | OUTPATIENT
Start: 2020-10-08 | End: 2021-01-11

## 2020-10-08 RX ORDER — CLONIDINE HYDROCHLORIDE 0.2 MG/1
TABLET ORAL
Qty: 180 TABLET | Refills: 0 | Status: SHIPPED | OUTPATIENT
Start: 2020-10-08 | End: 2020-11-19 | Stop reason: SDUPTHER

## 2020-10-08 NOTE — TELEPHONE ENCOUNTER
Date of last refill: 9-    Is there an Inspect on file: n/a    Last appt date: 9-      Next appt date: none e      Additional information:

## 2020-10-08 NOTE — TELEPHONE ENCOUNTER
Date of last refill: 7-    Is there an Inspect on file: n/a    Last appt date: 9-     Next appt date: none       Additional information:

## 2020-10-08 NOTE — TELEPHONE ENCOUNTER
Patient's mother was contacted last week, and stated that she would return phone call to office to schedule him.

## 2020-10-20 RX ORDER — ROSUVASTATIN CALCIUM 10 MG/1
TABLET, COATED ORAL
Qty: 90 TABLET | Refills: 0 | Status: SHIPPED | OUTPATIENT
Start: 2020-10-20 | End: 2021-01-18

## 2020-10-28 DIAGNOSIS — Z79.4 TYPE 2 DIABETES MELLITUS WITH HYPERGLYCEMIA, WITH LONG-TERM CURRENT USE OF INSULIN (HCC): Primary | ICD-10-CM

## 2020-10-28 DIAGNOSIS — E11.65 TYPE 2 DIABETES MELLITUS WITH HYPERGLYCEMIA, WITH LONG-TERM CURRENT USE OF INSULIN (HCC): Primary | ICD-10-CM

## 2020-10-28 RX ORDER — LINAGLIPTIN 5 MG/1
TABLET, FILM COATED ORAL
Qty: 90 TABLET | Refills: 0 | Status: SHIPPED | OUTPATIENT
Start: 2020-10-28 | End: 2021-10-05

## 2020-10-28 NOTE — TELEPHONE ENCOUNTER
Date of last refill: 8-4-2020    Is there an Inspect on file: N/A    Last appt date: 9-     Next appt date: NONE       Additional information:

## 2020-11-03 RX ORDER — ALLOPURINOL 100 MG/1
TABLET ORAL
Qty: 180 TABLET | Refills: 0 | Status: SHIPPED | OUTPATIENT
Start: 2020-11-03 | End: 2020-11-30 | Stop reason: SDUPTHER

## 2020-11-03 NOTE — TELEPHONE ENCOUNTER
Date of last refill:  9-    Is there an Inspect on file: n/a    Last appt date: 9-     Next appt date: none       Additional information:

## 2020-11-04 RX ORDER — VITAMIN B COMPLEX
TABLET ORAL
Qty: 30 TABLET | Refills: 2 | OUTPATIENT
Start: 2020-11-04

## 2020-11-04 NOTE — TELEPHONE ENCOUNTER
Date of last refill: 9-     Is there an Inspect on file: N/A    Last appt date: 9-     Next appt date: NONE       Additional information:  TOO SOON FOR REFILL. 6 MONTH SUPPLY WAS ORDERED THEN

## 2020-11-19 NOTE — TELEPHONE ENCOUNTER
Date of last refill:10/08/2020    Is there an Inspect on file:N/A    Last appt date:09/15/2020     Next appt date:N/A      Additional information:

## 2020-11-20 RX ORDER — CLONIDINE HYDROCHLORIDE 0.2 MG/1
0.2 TABLET ORAL 2 TIMES DAILY
Qty: 60 TABLET | Refills: 0 | Status: SHIPPED | OUTPATIENT
Start: 2020-11-20 | End: 2021-03-03 | Stop reason: SDUPTHER

## 2020-11-24 DIAGNOSIS — E13.9 DIABETES 1.5, MANAGED AS TYPE 2 (HCC): ICD-10-CM

## 2020-11-24 RX ORDER — SEMAGLUTIDE 1.34 MG/ML
INJECTION, SOLUTION SUBCUTANEOUS
Qty: 12 PEN | Refills: 0 | Status: SHIPPED | OUTPATIENT
Start: 2020-11-24 | End: 2021-03-08

## 2020-11-24 NOTE — TELEPHONE ENCOUNTER
Date of last refill: 9-9-2020    Is there an Inspect on file: n/a    Last appt date: 9-     Next appt date: none       Additional information:

## 2020-12-01 RX ORDER — ALLOPURINOL 100 MG/1
300 TABLET ORAL 2 TIMES DAILY
Qty: 540 TABLET | Refills: 0 | Status: SHIPPED | OUTPATIENT
Start: 2020-12-01 | End: 2021-03-03

## 2020-12-21 RX ORDER — HYDRALAZINE HYDROCHLORIDE 100 MG/1
100 TABLET, FILM COATED ORAL EVERY 8 HOURS
Qty: 90 TABLET | Refills: 0 | Status: SHIPPED | OUTPATIENT
Start: 2020-12-21 | End: 2021-01-26

## 2020-12-21 NOTE — TELEPHONE ENCOUNTER
Date of last refill:08/31/2020    Is there an Inspect on file:NA    Last appt date: 09/15/2020     Next appt date: NONE      Additional information:

## 2021-01-04 RX ORDER — LOSARTAN POTASSIUM 100 MG/1
TABLET ORAL
Qty: 30 TABLET | Refills: 0 | Status: SHIPPED | OUTPATIENT
Start: 2021-01-04 | End: 2021-01-29

## 2021-01-04 NOTE — TELEPHONE ENCOUNTER
Date of last refill: 9-    Is there an Inspect on file: n/a     Last appt date: 9-     Next appt date: NONE       Additional information:   NO SHOWED APPT ON 12-

## 2021-01-11 RX ORDER — AMLODIPINE BESYLATE 10 MG/1
TABLET ORAL
Qty: 21 TABLET | Refills: 0 | Status: SHIPPED | OUTPATIENT
Start: 2021-01-11 | End: 2021-03-03 | Stop reason: SDUPTHER

## 2021-01-11 NOTE — TELEPHONE ENCOUNTER
Date of last refill: 10-8-2020    Is there an Inspect on file: N/A     Last appt date: 9-     Next appt date: NONE       Additional information:

## 2021-01-18 RX ORDER — ROSUVASTATIN CALCIUM 10 MG/1
10 TABLET, COATED ORAL
Qty: 14 TABLET | Refills: 0 | Status: SHIPPED | OUTPATIENT
Start: 2021-01-18 | End: 2021-03-03

## 2021-01-18 NOTE — TELEPHONE ENCOUNTER
"Date of last refill:10-    Is there an Inspect on file: N/A     Last appt date: 9-     Next appt date: NONE       Additional information:  HAVE TRIED CONTACTING PATIENT WITHOUT SUCCESS.   CALLED THIS MORNING AT 0900 WITH VM COMING ON AND STATED, \"PLEASE ENTER YOUR PHONE NUMBER WITH AREA CODE AND PRESS POUND.\" PRACTICE PHONE NUMBER ENTERED MULTIPLE TIMES WITHOUT SUCCESS ON GETTING THROUGH TO PATIENT. PT HAS NO SHOWED MULTIPLE TIMES. 14 DAY SUPPLY READY TO SEND.   "

## 2021-01-25 NOTE — TELEPHONE ENCOUNTER
Date of last refill: 12/21/2020    Is there an Inspect on file:NA    Last appt date: 09/15/2020     Next appt date: NONE      Additional information:   I HAVE TRIED TO CONTACT THE PATIENT TWICE TO SCHEDULE AN APPT.  THERE IS NO VOICEMAIL SET UP

## 2021-01-26 RX ORDER — HYDRALAZINE HYDROCHLORIDE 100 MG/1
100 TABLET, FILM COATED ORAL EVERY 8 HOURS
Qty: 21 TABLET | Refills: 0 | Status: SHIPPED | OUTPATIENT
Start: 2021-01-26 | End: 2021-03-03 | Stop reason: SDUPTHER

## 2021-01-29 RX ORDER — LOSARTAN POTASSIUM 100 MG/1
TABLET ORAL
Qty: 30 TABLET | Refills: 0 | Status: SHIPPED | OUTPATIENT
Start: 2021-01-29 | End: 2021-03-02 | Stop reason: SDUPTHER

## 2021-01-29 NOTE — TELEPHONE ENCOUNTER
Date of last refill:1-4-2021    Is there an Inspect on file: N/A     Last appt date: 9-     Next appt date: NONE       Additional information:  NO CALL NO SHOW LAST APPTS ON 12-, 9- - HAVE TRIED CALLING PATIENT NUMEROUS TIMES WITHOUT ABILITY TO REACH PATIENT. LETTER WAS ALSO SENT TO PATIENT ON 1- ADVISING HIM THAT HE NEEDS AN APPT.

## 2021-03-02 ENCOUNTER — OFFICE VISIT (OUTPATIENT)
Dept: FAMILY MEDICINE CLINIC | Facility: CLINIC | Age: 51
End: 2021-03-02

## 2021-03-02 VITALS
OXYGEN SATURATION: 99 % | HEIGHT: 65 IN | WEIGHT: 268 LBS | BODY MASS INDEX: 44.65 KG/M2 | TEMPERATURE: 98.4 F | DIASTOLIC BLOOD PRESSURE: 76 MMHG | SYSTOLIC BLOOD PRESSURE: 148 MMHG | HEART RATE: 87 BPM

## 2021-03-02 DIAGNOSIS — N18.30 STAGE 3 CHRONIC KIDNEY DISEASE, UNSPECIFIED WHETHER STAGE 3A OR 3B CKD (HCC): ICD-10-CM

## 2021-03-02 DIAGNOSIS — M79.672 FOOT PAIN, LEFT: ICD-10-CM

## 2021-03-02 DIAGNOSIS — E55.9 VITAMIN D DEFICIENCY: ICD-10-CM

## 2021-03-02 DIAGNOSIS — E79.0 HYPERURICEMIA: ICD-10-CM

## 2021-03-02 DIAGNOSIS — H93.19 TINNITUS, UNSPECIFIED LATERALITY: ICD-10-CM

## 2021-03-02 DIAGNOSIS — L03.90 WOUND CELLULITIS: ICD-10-CM

## 2021-03-02 DIAGNOSIS — Z23 IMMUNIZATION DUE: ICD-10-CM

## 2021-03-02 DIAGNOSIS — E11.65 TYPE 2 DIABETES MELLITUS WITH HYPERGLYCEMIA, WITH LONG-TERM CURRENT USE OF INSULIN (HCC): ICD-10-CM

## 2021-03-02 DIAGNOSIS — Z11.59 NEED FOR HEPATITIS C SCREENING TEST: ICD-10-CM

## 2021-03-02 DIAGNOSIS — H91.93 BILATERAL HEARING LOSS, UNSPECIFIED HEARING LOSS TYPE: ICD-10-CM

## 2021-03-02 DIAGNOSIS — E11.65 UNCONTROLLED TYPE 2 DIABETES MELLITUS WITH HYPERGLYCEMIA (HCC): Primary | ICD-10-CM

## 2021-03-02 DIAGNOSIS — E66.01 CLASS 3 SEVERE OBESITY DUE TO EXCESS CALORIES WITH SERIOUS COMORBIDITY AND BODY MASS INDEX (BMI) OF 40.0 TO 44.9 IN ADULT (HCC): ICD-10-CM

## 2021-03-02 DIAGNOSIS — Z79.4 TYPE 2 DIABETES MELLITUS WITH HYPERGLYCEMIA, WITH LONG-TERM CURRENT USE OF INSULIN (HCC): ICD-10-CM

## 2021-03-02 DIAGNOSIS — I10 ESSENTIAL HYPERTENSION: ICD-10-CM

## 2021-03-02 DIAGNOSIS — E78.2 MIXED HYPERLIPIDEMIA: ICD-10-CM

## 2021-03-02 LAB
EXPIRATION DATE: NORMAL
GLUCOSE BLDC GLUCOMTR-MCNC: 208 MG/DL (ref 70–130)
HBA1C MFR BLD: 9.4 %
Lab: NORMAL

## 2021-03-02 PROCEDURE — 85025 COMPLETE CBC W/AUTO DIFF WBC: CPT | Performed by: FAMILY MEDICINE

## 2021-03-02 PROCEDURE — 84550 ASSAY OF BLOOD/URIC ACID: CPT | Performed by: FAMILY MEDICINE

## 2021-03-02 PROCEDURE — 99214 OFFICE O/P EST MOD 30 MIN: CPT | Performed by: FAMILY MEDICINE

## 2021-03-02 PROCEDURE — 80061 LIPID PANEL: CPT | Performed by: FAMILY MEDICINE

## 2021-03-02 PROCEDURE — 84439 ASSAY OF FREE THYROXINE: CPT | Performed by: FAMILY MEDICINE

## 2021-03-02 PROCEDURE — 84443 ASSAY THYROID STIM HORMONE: CPT | Performed by: FAMILY MEDICINE

## 2021-03-02 PROCEDURE — 86803 HEPATITIS C AB TEST: CPT | Performed by: FAMILY MEDICINE

## 2021-03-02 PROCEDURE — 83036 HEMOGLOBIN GLYCOSYLATED A1C: CPT | Performed by: FAMILY MEDICINE

## 2021-03-02 PROCEDURE — 82306 VITAMIN D 25 HYDROXY: CPT | Performed by: FAMILY MEDICINE

## 2021-03-02 PROCEDURE — 82962 GLUCOSE BLOOD TEST: CPT | Performed by: FAMILY MEDICINE

## 2021-03-02 PROCEDURE — 80053 COMPREHEN METABOLIC PANEL: CPT | Performed by: FAMILY MEDICINE

## 2021-03-02 NOTE — TELEPHONE ENCOUNTER
Date of last refill:    LOSARTAN: 1-  ALLOPURINOL: 12-1-2020    Is there an Inspect on file: N/A     Last appt date: 3-2-2021     Next appt date: 6-2-2021      Additional information:

## 2021-03-02 NOTE — PATIENT INSTRUCTIONS
A1c= 9.4 showng uncontrolled diabetes ( needs to be at 7.0-7.5)  , glucose = 203  Increase basaglar  To 65 units daily  Continue ozempic and tradjenta for diabetes  Fu labs  ConContinue allopurinol to prevent gout  Needs to fu with kidney docotr  Refer to ENT for timnnitus and to fu hearing deficit  Needs to get records from ophthalmologist  wll need to get colonoscopy for colon cancer screening  Low carb diet   Weight loss recommended for good health  Got pneumovax todayfu 3 mos  Monitor blood glucose and bring in at next appt  Bring in med listinue amlodipine losartan, hydralazine and clonidine for BP control  Continue rosuvastatin for cholesterl  kenny plan for referral for colonoscopy for colon cancer screning  Call 211 for covid 19 vaccine      Tinnitus  Tinnitus refers to hearing a sound when there is no actual source for that sound. This is often described as ringing in the ears. However, people with this condition may hear a variety of noises, in one ear or in both ears.  The sounds of tinnitus can be soft, loud, or somewhere in between. Tinnitus can last for a few seconds or can be constant for days. It may go away without treatment and come back at various times. When tinnitus is constant or happens often, it can lead to other problems, such as trouble sleeping and trouble concentrating.  Almost everyone experiences tinnitus at some point. Tinnitus that is long-lasting (chronic) or comes back often (recurs) may require medical attention.  What are the causes?  The cause of tinnitus is often not known. In some cases, it can result from:  · Exposure to loud noises from machinery, music, or other sources.  · An object (foreign body) stuck in the ear.  · Earwax buildup.  · Drinking alcohol or caffeine.  · Taking certain medicines.  · Age-related hearing loss.  It may also be caused by medical conditions such as:  · Ear or sinus infections.  · High blood pressure.  · Heart  diseases.  · Anemia.  · Allergies.  · Meniere's disease.  · Thyroid problems.  · Tumors.  · A weak, bulging blood vessel (aneurysm) near the ear.  What are the signs or symptoms?  The main symptom of tinnitus is hearing a sound when there is no source for that sound. It may sound like:  · Buzzing.  · Roaring.  · Ringing.  · Blowing air.  · Hissing.  · Whistling.  · Sizzling.  · Humming.  · Running water.  · A musical note.  · Tapping.  Symptoms may affect only one ear (unilateral) or both ears (bilateral).  How is this diagnosed?  Tinnitus is diagnosed based on your symptoms, your medical history, and a physical exam. Your health care provider may do a thorough hearing test (audiologic exam) if your tinnitus:  · Is unilateral.  · Causes hearing difficulties.  · Lasts 6 months or longer.  You may work with a health care provider who specializes in hearing disorders (audiologist). You may be asked questions about your symptoms and how they affect your daily life. You may have other tests done, such as:  · CT scan.  · MRI.  · An imaging test of how blood flows through your blood vessels (angiogram).  How is this treated?  Treating an underlying medical condition can sometimes make tinnitus go away. If your tinnitus continues, other treatments may include:  · Medicines.  · Therapy and counseling to help you manage the stress of living with tinnitus.  · Sound generators to mask the tinnitus. These include:  ? Tabletop sound machines that play relaxing sounds to help you fall asleep.  ? Wearable devices that fit in your ear and play sounds or music.  ? Acoustic neural stimulation. This involves using headphones to listen to music that contains an auditory signal. Over time, listening to this signal may change some pathways in your brain and make you less sensitive to tinnitus. This treatment is used for very severe cases when no other treatment is working.  · Using hearing aids or cochlear implants if your tinnitus is  related to hearing loss. Hearing aids are worn in the outer ear. Cochlear implants are surgically placed in the inner ear.  Follow these instructions at home:  Managing symptoms         · When possible, avoid being in loud places and being exposed to loud sounds.  · Wear hearing protection, such as earplugs, when you are exposed to loud noises.  · Use a white noise machine, a humidifier, or other devices to mask the sound of tinnitus.  · Practice techniques for reducing stress, such as meditation, yoga, or deep breathing. Work with your health care provider if you need help with managing stress.  · Sleep with your head slightly raised. This may reduce the impact of tinnitus.  General instructions  · Do not use stimulants, such as nicotine, alcohol, or caffeine. Talk with your health care provider about other stimulants to avoid. Stimulants are substances that can make you feel alert and attentive by increasing certain activities in the body (such as heart rate and blood pressure). These substances may make tinnitus worse.  · Take over-the-counter and prescription medicines only as told by your health care provider.  · Try to get plenty of sleep each night.  · Keep all follow-up visits as told by your health care provider. This is important.  Contact a health care provider if:  · Your tinnitus continues for 3 weeks or longer without stopping.  · You develop sudden hearing loss.  · Your symptoms get worse or do not get better with home care.  · You feel you are not able to manage the stress of living with tinnitus.  Get help right away if:  · You develop tinnitus after a head injury.  · You have tinnitus along with any of the following:  ? Dizziness.  ? Loss of balance.  ? Nausea and vomiting.  ? Sudden, severe headache.  These symptoms may represent a serious problem that is an emergency. Do not wait to see if the symptoms will go away. Get medical help right away. Call your local emergency services (742 in the  [place] Do not drive yourself to the hospital.  Summary  · Tinnitus refers to hearing a sound when there is no actual source for that sound. This is often described as ringing in the ears.  · Symptoms may affect only one ear (unilateral) or both ears (bilateral).  · Use a white noise machine, a humidifier, or other devices to mask the sound of tinnitus.  · Do not use stimulants, such as nicotine, alcohol, or caffeine. Talk with your health care provider about other stimulants to avoid. These substances may make tinnitus worse.  This information is not intended to replace advice given to you by your health care provider. Make sure you discuss any questions you have with your health care provider.  Document Revised: 07/01/2020 Document Reviewed: 09/27/2018  Elsevier Patient Education © 2020 [employer]

## 2021-03-02 NOTE — PROGRESS NOTES
Subjective   Sam Prakash is a 50 y.o. male.     Chief Complaint   Patient presents with   • Diabetes   • Hypertension   • Hyperlipidemia   • Follow-up   • Tinnitus       History of Present Illness   Fu DM, HTN, HLD, Obesity  DM uncontrolled A1c= 9.4, up from 7.4  glucose=2o8  Will increase Basaglar to 65 units, continue tradjenta and Ozempic  Pt with worsening hearing loss, tinnitus, will refer to ENT    Hx ckd, hyperuricemia  Will check labs  The following portions of the patient's history were reviewed and updated as appropriate: allergies, current medications, past family history, past medical history, past social history, past surgical history and problem list.    Past Medical History:   Diagnosis Date   • CKD (chronic kidney disease)    • Diabetes mellitus (CMS/HCC)    • Glaucoma    • Gout    • Gout    • Hard of hearing    • Hearing loss    • Heart disease    • History of MI (myocardial infarction)    • History of type 2 diabetes mellitus    • Hyperlipidemia    • Hypertension    • Myocardial infarction (CMS/HCC)    • Obesity        Past Surgical History:   Procedure Laterality Date   • CATARACT EXTRACTION Bilateral 05/2015   • OTHER SURGICAL HISTORY  12/2014    [facility]       Family History   Problem Relation Age of Onset   • Heart disease Father    • Other Father         HEART ATTACK IN 2008   • Arthritis Maternal Grandmother    • Diabetes Maternal Grandfather    • Heart disease Maternal Grandfather    • Hypertension Maternal Grandfather    • Diabetes Paternal Grandfather    • Hyperlipidemia Paternal Grandfather    • Hypertension Paternal Grandfather        Review of Systems   Constitutional: Negative for fatigue, unexpected weight gain and unexpected weight loss.   HENT: Positive for hearing loss and tinnitus. Negative for congestion, sinus pressure and sore throat.         Normal smell/taste   Eyes: Negative for blurred vision and visual disturbance.   Respiratory: Negative for  cough, shortness of breath and wheezing.    Cardiovascular: Negative for chest pain, palpitations and leg swelling.   Gastrointestinal: Negative for abdominal pain, constipation, diarrhea, nausea, vomiting, GERD and indigestion.   Musculoskeletal: Negative for arthralgias, back pain, gait problem, joint swelling and neck pain.   Skin: Negative for rash, skin lesions and bruise.   Allergic/Immunologic: Negative for environmental allergies.   Neurological: Negative for dizziness, tremors, syncope, weakness, light-headedness, headache and memory problem.   Psychiatric/Behavioral: Negative for sleep disturbance, depressed mood and stress. The patient is not nervous/anxious.        Objective   Physical Exam  Feet:      Right foot:      Protective Sensation: 4 sites tested. 3 sites sensed.      Skin integrity: Skin integrity normal.      Left foot:      Protective Sensation: 4 sites tested. 3 sites sensed.      Skin integrity: Skin integrity normal.   Skin:     Comments: No wounbds         Vitals:    03/02/21 0808   BP: 148/76   Pulse: 87   Temp: 98.4 °F (36.9 °C)   SpO2: 99%     Body mass index is 44.6 kg/m².    Hemoglobin A1C   Date Value Ref Range Status   03/02/2021 9.4 % Final   06/22/2020 7.7 % Final   10/30/2019 8.1 % Final     Glucose   Date Value Ref Range Status   03/02/2021 208 (A) 70 - 130 mg/dL Final   09/08/2020 302 (A) 70 - 130 mg/dL Final   08/25/2020 281 (A) 70 - 130 mg/dL Final     Comment:     breakfast of oats around 0700 this morning      LDL Cholesterol    Date Value Ref Range Status   06/22/2020   Final     Comment:     Unable to calculate   06/22/2020 66 0 - 100 mg/dL Final   10/30/2019 72 0 - 100 mg/dL Final   11/06/2018 76 0 - 100 mg/dL Final     TSH   Date Value Ref Range Status   06/22/2020 2.670 0.270 - 4.200 uIU/mL Final   10/30/2019 3.890 0.270 - 4.200 uIU/mL Final     Results for orders placed or performed in visit on 03/02/21   POCT glycated hemoglobin, total    Specimen: Urine   Result  Value Ref Range    Hemoglobin A1C 9.4 %    Lot Number [id number]     Expiration Date 11/24/2022    POC Glucose    Specimen: Blood   Result Value Ref Range    Glucose 208 (A) 70 - 130 mg/dL   Results for orders placed or performed in visit on 09/08/20   POC Glucose    Specimen: Blood   Result Value Ref Range    Glucose 302 (A) 70 - 130 mg/dL   Results for orders placed or performed in visit on 08/25/20   POCT Glucose    Specimen: Blood   Result Value Ref Range    Glucose 281 (A) 70 - 130 mg/dL   Uric acid    Specimen: Arm, Right; Blood   Result Value Ref Range    Uric Acid 4.9 3.4 - 7.0 mg/dL   Basic metabolic panel    Specimen: Arm, Right; Blood   Result Value Ref Range    Glucose 303 (H) 65 - 99 mg/dL    BUN 29 (H) 6 - 20 mg/dL    Creatinine 1.90 (H) 0.76 - 1.27 mg/dL    Sodium 137 136 - 145 mmol/L    Potassium 4.9 3.5 - 5.2 mmol/L    Chloride 98 98 - 107 mmol/L    CO2 26.4 22.0 - 29.0 mmol/L    Calcium 10.0 8.6 - 10.5 mg/dL    eGFR Non African Amer 38 (L) >60 mL/min/1.73    BUN/Creatinine Ratio 15.3 7.0 - 25.0    Anion Gap 12.6 5.0 - 15.0 mmol/L   Results for orders placed or performed in visit on 06/22/20   PSA SCREENING    Specimen: Arm, Left; Blood   Result Value Ref Range    PSA 0.579 0.000 - 4.000 ng/mL   CBC Auto Differential    Specimen: Arm, Left; Blood   Result Value Ref Range    WBC 9.39 3.40 - 10.80 10*3/mm3    RBC 5.15 4.14 - 5.80 10*6/mm3    Hemoglobin 15.2 13.0 - 17.7 g/dL    Hematocrit 45.6 37.5 - 51.0 %    MCV 88.5 79.0 - 97.0 fL    MCH 29.5 26.6 - 33.0 pg    MCHC 33.3 31.5 - 35.7 g/dL    RDW 14.0 12.3 - 15.4 %    RDW-SD 45.1 37.0 - 54.0 fl    MPV 12.6 (H) 6.0 - 12.0 fL    Platelets 210 140 - 450 10*3/mm3    Neutrophil % 67.3 42.7 - 76.0 %    Lymphocyte % 22.5 19.6 - 45.3 %    Monocyte % 6.8 5.0 - 12.0 %    Eosinophil % 2.8 0.3 - 6.2 %    Basophil % 0.4 0.0 - 1.5 %    Immature Grans % 0.2 0.0 - 0.5 %    Neutrophils, Absolute 6.32 1.70 - 7.00 10*3/mm3    Lymphocytes, Absolute 2.11 0.70 - 3.10  10*3/mm3    Monocytes, Absolute 0.64 0.10 - 0.90 10*3/mm3    Eosinophils, Absolute 0.26 0.00 - 0.40 10*3/mm3    Basophils, Absolute 0.04 0.00 - 0.20 10*3/mm3    Immature Grans, Absolute 0.02 0.00 - 0.05 10*3/mm3    nRBC 0.0 0.0 - 0.2 /100 WBC   MicroAlbumin, Urine, Random - Urine, Clean Catch    Specimen: Urine, Clean Catch   Result Value Ref Range    Microalbumin, Urine 352.8 mg/dL   POCT glycated hemoglobin, total    Specimen: Urine   Result Value Ref Range    Hemoglobin A1C 7.7 %    Lot Number [id number]     Expiration Date 01/13/2022    Vitamin D 25 Hydroxy    Specimen: Arm, Left; Blood   Result Value Ref Range    25 Hydroxy, Vitamin D 23.1 (L) 30.0 - 100.0 ng/ml   TSH    Specimen: Arm, Left; Blood   Result Value Ref Range    TSH 2.670 0.270 - 4.200 uIU/mL   T4, Free    Specimen: Arm, Left; Blood   Result Value Ref Range    Free T4 1.12 0.93 - 1.70 ng/dL   LDL Cholesterol, Direct    Specimen: Arm, Left; Blood   Result Value Ref Range    LDL Cholesterol  66 0 - 100 mg/dL   Vitamin B12    Specimen: Arm, Left; Blood   Result Value Ref Range    Vitamin B-12 745 211 - 946 pg/mL   Lipid Panel    Specimen: Arm, Left; Blood   Result Value Ref Range    Total Cholesterol 169 0 - 200 mg/dL    Triglycerides 441 (H) 0 - 150 mg/dL    HDL Cholesterol 33 (L) 40 - 60 mg/dL    LDL Cholesterol       VLDL Cholesterol      LDL/HDL Ratio     Comprehensive Metabolic Panel    Specimen: Arm, Left; Blood   Result Value Ref Range    Glucose 207 (H) 65 - 99 mg/dL    BUN 27 (H) 6 - 20 mg/dL    Creatinine 1.86 (H) 0.76 - 1.27 mg/dL    Sodium 138 136 - 145 mmol/L    Potassium 4.8 3.5 - 5.2 mmol/L    Chloride 103 98 - 107 mmol/L    CO2 22.2 22.0 - 29.0 mmol/L    Calcium 9.4 8.6 - 10.5 mg/dL    Total Protein 7.7 6.0 - 8.5 g/dL    Albumin 4.50 3.50 - 5.20 g/dL    ALT (SGPT) 32 1 - 41 U/L    AST (SGOT) 22 1 - 40 U/L    Alkaline Phosphatase 88 39 - 117 U/L    Total Bilirubin 0.5 0.2 - 1.2 mg/dL    eGFR Non African Amer 39 (L) >60 mL/min/1.73     Globulin 3.2 gm/dL    A/G Ratio 1.4 g/dL    BUN/Creatinine Ratio 14.5 7.0 - 25.0    Anion Gap 12.8 5.0 - 15.0 mmol/L   Results for orders placed or performed in visit on 10/30/19   CBC Auto Differential    Specimen: Arm, Right; Blood   Result Value Ref Range    WBC 9.74 3.40 - 10.80 10*3/mm3    RBC 4.91 4.14 - 5.80 10*6/mm3    Hemoglobin 14.9 13.0 - 17.7 g/dL    Hematocrit 43.6 37.5 - 51.0 %    MCV 88.8 79.0 - 97.0 fL    MCH 30.3 26.6 - 33.0 pg    MCHC 34.2 31.5 - 35.7 g/dL    RDW 14.1 12.3 - 15.4 %    RDW-SD 44.7 37.0 - 54.0 fl    MPV 12.0 6.0 - 12.0 fL    Platelets 215 140 - 450 10*3/mm3    Neutrophil % 66.3 42.7 - 76.0 %    Lymphocyte % 22.3 19.6 - 45.3 %    Monocyte % 7.0 5.0 - 12.0 %    Eosinophil % 3.4 0.3 - 6.2 %    Basophil % 0.7 0.0 - 1.5 %    Immature Grans % 0.3 0.0 - 0.5 %    Neutrophils, Absolute 6.46 1.70 - 7.00 10*3/mm3    Lymphocytes, Absolute 2.17 0.70 - 3.10 10*3/mm3    Monocytes, Absolute 0.68 0.10 - 0.90 10*3/mm3    Eosinophils, Absolute 0.33 0.00 - 0.40 10*3/mm3    Basophils, Absolute 0.07 0.00 - 0.20 10*3/mm3    Immature Grans, Absolute 0.03 0.00 - 0.05 10*3/mm3    nRBC 0.2 0.0 - 0.2 /100 WBC     *Note: Due to a large number of results and/or encounters for the requested time period, some results have not been displayed. A complete set of results can be found in Results Review.       Assessment/Plan   Diagnoses and all orders for this visit:    1. Uncontrolled type 2 diabetes mellitus with hyperglycemia (CMS/[facility]) (Primary)  -     POC Glucose  -     POCT glycated hemoglobin, total  -     CBC Auto Differential  -     Comprehensive metabolic panel; Future    2. Type 2 diabetes mellitus with hyperglycemia, with long-term current use of insulin (CMS/[facility])    3. Essential hypertension    4. Mixed hyperlipidemia  -     Lipid Panel; Future    5. Tinnitus, unspecified laterality    6. Bilateral hearing loss, unspecified hearing loss type    7. Stage 3 chronic kidney disease, unspecified whether stage 3a  or 3b CKD (CMS/HCC)    8. Hyperuricemia  -     Uric acid; Future    9. Class 3 severe obesity due to excess calories with serious comorbidity and body mass index (BMI) of 40.0 to 44.9 in adult (CMS/HCC)  -     TSH+Free T4    10. Vitamin D deficiency  -     Vitamin D 25 hydroxy; Future    11. Immunization due  -     pneumococcal polysaccharide 23-valent (PNEUMOVAX-23) vaccine 0.5 mL    12. Need for hepatitis C screening test  -     Hepatitis C antibody; Future      Increase basaglar to 65 units  Continue tradjenta and ozempic  Fu labs  Refill meds as needed  contonue meds for BP and cholesterol  Keep fu with nephrology and ENT  Bring in glucose readings at [facility] appt  Consider colonsocpy for colon cancer screening  Call 211 to schedule covid vccine  Low carb diet  Weight loss recommended for good health

## 2021-03-03 ENCOUNTER — TELEPHONE (OUTPATIENT)
Dept: FAMILY MEDICINE CLINIC | Facility: CLINIC | Age: 51
End: 2021-03-03

## 2021-03-03 LAB
25(OH)D3 SERPL-MCNC: 19.7 NG/ML (ref 30–100)
ALBUMIN SERPL-MCNC: 4 G/DL (ref 3.5–5.2)
ALBUMIN/GLOB SERPL: 1.3 G/DL
ALP SERPL-CCNC: 90 U/L (ref 39–117)
ALT SERPL W P-5'-P-CCNC: 29 U/L (ref 1–41)
ANION GAP SERPL CALCULATED.3IONS-SCNC: 10.9 MMOL/L (ref 5–15)
AST SERPL-CCNC: 25 U/L (ref 1–40)
BASOPHILS # BLD AUTO: 0.05 10*3/MM3 (ref 0–0.2)
BASOPHILS NFR BLD AUTO: 0.5 % (ref 0–1.5)
BILIRUB SERPL-MCNC: 0.4 MG/DL (ref 0–1.2)
BUN SERPL-MCNC: 31 MG/DL (ref 6–20)
BUN/CREAT SERPL: 16.4 (ref 7–25)
CALCIUM SPEC-SCNC: 9.1 MG/DL (ref 8.6–10.5)
CHLORIDE SERPL-SCNC: 103 MMOL/L (ref 98–107)
CHOLEST SERPL-MCNC: 266 MG/DL (ref 0–200)
CO2 SERPL-SCNC: 25.1 MMOL/L (ref 22–29)
CREAT SERPL-MCNC: 1.89 MG/DL (ref 0.76–1.27)
DEPRECATED RDW RBC AUTO: 43.3 FL (ref 37–54)
EOSINOPHIL # BLD AUTO: 0.26 10*3/MM3 (ref 0–0.4)
EOSINOPHIL NFR BLD AUTO: 2.6 % (ref 0.3–6.2)
ERYTHROCYTE [DISTWIDTH] IN BLOOD BY AUTOMATED COUNT: 13.5 % (ref 12.3–15.4)
GFR SERPL CREATININE-BSD FRML MDRD: 38 ML/MIN/1.73
GLOBULIN UR ELPH-MCNC: 3.2 GM/DL
GLUCOSE SERPL-MCNC: 201 MG/DL (ref 65–99)
HCT VFR BLD AUTO: 44.8 % (ref 37.5–51)
HCV AB SER DONR QL: NORMAL
HDLC SERPL-MCNC: 34 MG/DL (ref 40–60)
HGB BLD-MCNC: 15 G/DL (ref 13–17.7)
IMM GRANULOCYTES # BLD AUTO: 0.04 10*3/MM3 (ref 0–0.05)
IMM GRANULOCYTES NFR BLD AUTO: 0.4 % (ref 0–0.5)
LDLC SERPL CALC-MCNC: 133 MG/DL (ref 0–100)
LDLC/HDLC SERPL: 3.66 {RATIO}
LYMPHOCYTES # BLD AUTO: 1.75 10*3/MM3 (ref 0.7–3.1)
LYMPHOCYTES NFR BLD AUTO: 17.7 % (ref 19.6–45.3)
MCH RBC QN AUTO: 29.5 PG (ref 26.6–33)
MCHC RBC AUTO-ENTMCNC: 33.5 G/DL (ref 31.5–35.7)
MCV RBC AUTO: 88.2 FL (ref 79–97)
MONOCYTES # BLD AUTO: 0.65 10*3/MM3 (ref 0.1–0.9)
MONOCYTES NFR BLD AUTO: 6.6 % (ref 5–12)
NEUTROPHILS NFR BLD AUTO: 7.11 10*3/MM3 (ref 1.7–7)
NEUTROPHILS NFR BLD AUTO: 72.2 % (ref 42.7–76)
NRBC BLD AUTO-RTO: 0 /100 WBC (ref 0–0.2)
PLATELET # BLD AUTO: 198 10*3/MM3 (ref 140–450)
PMV BLD AUTO: 12.9 FL (ref 6–12)
POTASSIUM SERPL-SCNC: 5 MMOL/L (ref 3.5–5.2)
PROT SERPL-MCNC: 7.2 G/DL (ref 6–8.5)
RBC # BLD AUTO: 5.08 10*6/MM3 (ref 4.14–5.8)
SODIUM SERPL-SCNC: 139 MMOL/L (ref 136–145)
T4 FREE SERPL-MCNC: 1.04 NG/DL (ref 0.93–1.7)
TRIGL SERPL-MCNC: 538 MG/DL (ref 0–150)
TSH SERPL DL<=0.05 MIU/L-ACNC: 2.62 UIU/ML (ref 0.27–4.2)
URATE SERPL-MCNC: 4.1 MG/DL (ref 3.4–7)
VLDLC SERPL-MCNC: 99 MG/DL (ref 5–40)
WBC # BLD AUTO: 9.86 10*3/MM3 (ref 3.4–10.8)

## 2021-03-03 RX ORDER — CLONIDINE HYDROCHLORIDE 0.2 MG/1
0.2 TABLET ORAL 2 TIMES DAILY
Qty: 180 TABLET | Refills: 1 | Status: SHIPPED | OUTPATIENT
Start: 2021-03-03 | End: 2021-08-24

## 2021-03-03 RX ORDER — AMLODIPINE BESYLATE 10 MG/1
10 TABLET ORAL DAILY
Qty: 90 TABLET | Refills: 1 | Status: SHIPPED | OUTPATIENT
Start: 2021-03-03 | End: 2021-08-09

## 2021-03-03 RX ORDER — ERGOCALCIFEROL 1.25 MG/1
50000 CAPSULE ORAL
Qty: 12 CAPSULE | Refills: 1 | Status: SHIPPED | OUTPATIENT
Start: 2021-03-03 | End: 2021-06-29

## 2021-03-03 RX ORDER — LOSARTAN POTASSIUM 100 MG/1
100 TABLET ORAL DAILY
Qty: 90 TABLET | Refills: 0 | Status: SHIPPED | OUTPATIENT
Start: 2021-03-03 | End: 2021-05-28

## 2021-03-03 RX ORDER — ALLOPURINOL 100 MG/1
TABLET ORAL
Qty: 540 TABLET | Refills: 0 | Status: SHIPPED | OUTPATIENT
Start: 2021-03-03 | End: 2021-05-28

## 2021-03-03 RX ORDER — HYDRALAZINE HYDROCHLORIDE 25 MG/1
25 TABLET, FILM COATED ORAL 3 TIMES DAILY
Qty: 270 TABLET | Refills: 1 | Status: SHIPPED | OUTPATIENT
Start: 2021-03-03 | End: 2021-08-24

## 2021-03-03 RX ORDER — ROSUVASTATIN CALCIUM 10 MG/1
10 TABLET, COATED ORAL DAILY
Qty: 90 TABLET | Refills: 1 | Status: SHIPPED | OUTPATIENT
Start: 2021-03-03 | End: 2021-06-26

## 2021-03-03 NOTE — TELEPHONE ENCOUNTER
----- Message from Barbi Vega MD sent at 3/3/2021  7:07 AM EST -----  CALL PT/MOM.  hAVE ORDERED WEEKLY VIT D FOR LOW VIT D. HAVE ALSO REORDERED OTHER MEDS FOR BP, URIC ACID, CHOLESTEROL

## 2021-03-03 NOTE — TELEPHONE ENCOUNTER
CALLED PATIENT. PATIENT'S MOTHER MEENA ANSWERED. PT'S IDENTITY VERIFIED. ADVISED PT'S MOTHER OF PROVIDER'S RESPONSE RE: LAB RESULTS AND THAT MEDS HAVE BEEN SENT TO THE [facility] IN [place]. SHE VOICED UNDERSTANDING.

## 2021-03-06 DIAGNOSIS — E13.9 DIABETES 1.5, MANAGED AS TYPE 2 (HCC): ICD-10-CM

## 2021-03-08 RX ORDER — SEMAGLUTIDE 1.34 MG/ML
INJECTION, SOLUTION SUBCUTANEOUS
Qty: 18 ML | Refills: 5 | Status: SHIPPED | OUTPATIENT
Start: 2021-03-08 | End: 2021-08-04

## 2021-03-08 NOTE — TELEPHONE ENCOUNTER
Date of last refill: 11-    Is there an Inspect on file: N/A     Last appt date: 3-2-2021     Next appt date: 6-2-2021      Additional information:

## 2021-03-22 RX ORDER — LOSARTAN POTASSIUM 100 MG/1
TABLET ORAL
Qty: 30 TABLET | OUTPATIENT
Start: 2021-03-22

## 2021-03-25 DIAGNOSIS — L03.119 CELLULITIS AND ABSCESS OF FOOT: ICD-10-CM

## 2021-03-25 DIAGNOSIS — L02.619 CELLULITIS AND ABSCESS OF FOOT: ICD-10-CM

## 2021-03-25 NOTE — TELEPHONE ENCOUNTER
Date of last refill: 9-    Is there an Inspect on file: N/A     Last appt date: 3-2-2021     Next appt date: 6-2-2021      Additional information:

## 2021-03-25 NOTE — TELEPHONE ENCOUNTER
Caller: MEENA VASQUEZ    Relationship: Mother    Best call back number: [phone]     Medication needed:   Requested Prescriptions     Pending Prescriptions Disp Refills   • Insulin Glargine (BASAGLAR KWIKPEN) 100 UNIT/ML injection pen 20 pen 0     Sig: Inject 60 Units under the skin into the appropriate area as directed Daily. take32 units 2x a day       When do you need the refill by: ASAP    What additional details did the patient provide when requesting the medication: A NEW REFILL ORDER NEEDS TO BE SENT TO THE PHARMACY.     Does the patient have less than a 3 day supply:  [x] Yes  [] No    What is the patient's preferred pharmacy:        [facility]/pharmacy #[id number] - [address] AT [address] [phone] [place] [phone] [phone]

## 2021-03-26 RX ORDER — INSULIN GLARGINE 100 [IU]/ML
60 INJECTION, SOLUTION SUBCUTANEOUS DAILY
Qty: 20 PEN | Refills: 1 | Status: SHIPPED | OUTPATIENT
Start: 2021-03-26 | End: 2021-06-29 | Stop reason: SDUPTHER

## 2021-04-27 ENCOUNTER — TELEPHONE (OUTPATIENT)
Dept: FAMILY MEDICINE CLINIC | Facility: CLINIC | Age: 51
End: 2021-04-27

## 2021-04-27 RX ORDER — FLUTICASONE PROPIONATE 50 MCG
2 SPRAY, SUSPENSION (ML) NASAL DAILY
Qty: 15.8 ML | Refills: 1 | Status: SHIPPED | OUTPATIENT
Start: 2021-04-27

## 2021-04-27 NOTE — TELEPHONE ENCOUNTER
.    Caller: MEENA VASQUEZ    Relationship: Mother    Best call back number: [phone]    Medication needed: SOMETHING FOR SINUSES STOPPED UP      When do you need the refill by: 04/27/21    What additional details did the patient provide when requesting the medication: PATIENT'S MOTHER IS CALLING TO STATE PATIENT'S HEAD IS STOPPED UP AND HE IS HAVING DIFFICULTY BREATHING.  SHE STATES HE IS ALSO SNEEZING A LOT.  SHE STATES HE IS STILL HAVING THE TINNITIS AND WONDERS IF IT IS CONNECTED TO THE SINUS ISSUES.   SHE IS WANTING  TO KNOW WHAT DR VEGA RECOMMENDS,EITHER OVER THE COUNTER OR AN RX.  SHE STATES SHE IS CAUTIOUS BECAUSE OF DIABETES.    Does the patient have less than a 3 day supply:  [x] Yes  [] No    What is the patient's preferred pharmacy:    [facility]/pharmacy #[id number] - [facility] [place] - [address] [place] [address] AT [address] [phone] [place] [phone] [phone]

## 2021-04-28 NOTE — TELEPHONE ENCOUNTER
"Hub is instructed to read this note to patient.  CALLED PT/PT'S MOTHER AT THIS TIME. RECORDING CAME ON THAT STATED, \"PLEASE ENTER YOUR PHONE NUMBER FOLLOWED BY THE POUND SIGN\" PT/MOTHER WAS GOING TO BE NOTIFIED THAT DR. VEGA HAS SENT OVER A PRESCRIPTION FOR FLONASE AND THAT IF SYMPTOMS PERSIST, LORE MAY NEED TO SEE ENT.   "

## 2021-05-28 RX ORDER — ALLOPURINOL 100 MG/1
TABLET ORAL
Qty: 540 TABLET | Refills: 0 | Status: SHIPPED | OUTPATIENT
Start: 2021-05-28 | End: 2021-08-24

## 2021-05-28 RX ORDER — LOSARTAN POTASSIUM 100 MG/1
TABLET ORAL
Qty: 90 TABLET | Refills: 0 | Status: SHIPPED | OUTPATIENT
Start: 2021-05-28 | End: 2021-08-24

## 2021-05-28 NOTE — TELEPHONE ENCOUNTER
Date of last refill: 03/03/21    Is there an Inspect on file:NA    Last appt date: 03/02/21     Next appt date:06/02/21      Additional information:

## 2021-06-01 ENCOUNTER — TELEPHONE (OUTPATIENT)
Dept: FAMILY MEDICINE CLINIC | Facility: CLINIC | Age: 51
End: 2021-06-01

## 2021-06-01 DIAGNOSIS — U07.1 COVID-19 VIRUS DETECTED: ICD-10-CM

## 2021-06-01 DIAGNOSIS — R05.9 COUGH: Primary | ICD-10-CM

## 2021-06-01 DIAGNOSIS — E13.9 DIABETES 1.5, MANAGED AS TYPE 2 (HCC): ICD-10-CM

## 2021-06-01 RX ORDER — METHYLPREDNISOLONE SODIUM SUCCINATE 125 MG/2ML
125 INJECTION, POWDER, LYOPHILIZED, FOR SOLUTION INTRAMUSCULAR; INTRAVENOUS AS NEEDED
Status: CANCELLED | OUTPATIENT
Start: 2021-06-02

## 2021-06-01 RX ORDER — DIPHENHYDRAMINE HYDROCHLORIDE 50 MG/ML
50 INJECTION INTRAMUSCULAR; INTRAVENOUS AS NEEDED
Status: CANCELLED | OUTPATIENT
Start: 2021-06-02

## 2021-06-01 RX ORDER — SODIUM CHLORIDE 9 MG/ML
50 INJECTION, SOLUTION INTRAVENOUS ONCE
Status: CANCELLED | OUTPATIENT
Start: 2021-06-02

## 2021-06-01 RX ORDER — EPINEPHRINE 1 MG/ML
0.3 INJECTION, SOLUTION INTRAMUSCULAR; SUBCUTANEOUS AS NEEDED
Status: CANCELLED | OUTPATIENT
Start: 2021-06-02

## 2021-06-01 NOTE — TELEPHONE ENCOUNTER
See orders for infusion of monoclonal antibodies casirivimam-imdevimabtomorrow 730 .. fax to infusion center

## 2021-06-01 NOTE — TELEPHONE ENCOUNTER
LORE'S SISTER CALLED IN FOR LORE BECAUSE HIS MOM MEENA IS STILL IN THE HOSPITAL.  LORE TESTED POSITIVE FOR COVID YESTERDAY AT [facility] IN [place] AND THEY SUGGESTED THAT HE CALL HIS PCP TO SEE ABOUT GETTING THE INFUSION TO HELP WITH THE HEALING.  PLEASE ADVISE.

## 2021-06-02 ENCOUNTER — HOSPITAL ENCOUNTER (OUTPATIENT)
Dept: GENERAL RADIOLOGY | Facility: HOSPITAL | Age: 51
Discharge: HOME | End: 2021-06-02

## 2021-06-02 ENCOUNTER — HOSPITAL ENCOUNTER (OUTPATIENT)
Dept: INFUSION THERAPY | Facility: HOSPITAL | Age: 51
Discharge: HOME | End: 2021-06-02

## 2021-06-02 VITALS
RESPIRATION RATE: 16 BRPM | SYSTOLIC BLOOD PRESSURE: 134 MMHG | TEMPERATURE: 99.9 F | OXYGEN SATURATION: 95 % | DIASTOLIC BLOOD PRESSURE: 77 MMHG | HEART RATE: 79 BPM

## 2021-06-02 DIAGNOSIS — R05.9 COUGH: Primary | ICD-10-CM

## 2021-06-02 DIAGNOSIS — E13.9 DIABETES 1.5, MANAGED AS TYPE 2 (HCC): ICD-10-CM

## 2021-06-02 DIAGNOSIS — U07.1 COVID-19: ICD-10-CM

## 2021-06-02 DIAGNOSIS — U07.1 COVID-19 VIRUS DETECTED: ICD-10-CM

## 2021-06-02 LAB
ALBUMIN SERPL-MCNC: 3.9 G/DL (ref 3.5–5.2)
ALBUMIN/GLOB SERPL: 1.1 G/DL
ALP SERPL-CCNC: 73 U/L (ref 39–117)
ALT SERPL W P-5'-P-CCNC: 29 U/L (ref 1–41)
ANION GAP SERPL CALCULATED.3IONS-SCNC: 13.9 MMOL/L (ref 5–15)
AST SERPL-CCNC: 41 U/L (ref 1–40)
BASOPHILS # BLD AUTO: 0.01 10*3/MM3 (ref 0–0.2)
BASOPHILS NFR BLD AUTO: 0.2 % (ref 0–1.5)
BILIRUB SERPL-MCNC: 0.5 MG/DL (ref 0–1.2)
BUN SERPL-MCNC: 31 MG/DL (ref 6–20)
BUN/CREAT SERPL: 16.1 (ref 7–25)
CALCIUM SPEC-SCNC: 8.5 MG/DL (ref 8.6–10.5)
CHLORIDE SERPL-SCNC: 100 MMOL/L (ref 98–107)
CO2 SERPL-SCNC: 20.1 MMOL/L (ref 22–29)
CREAT SERPL-MCNC: 1.92 MG/DL (ref 0.76–1.27)
D DIMER PPP FEU-MCNC: 0.42 MG/L (FEU) (ref 0–0.59)
DEPRECATED RDW RBC AUTO: 43.2 FL (ref 37–54)
EOSINOPHIL # BLD AUTO: 0 10*3/MM3 (ref 0–0.4)
EOSINOPHIL NFR BLD AUTO: 0 % (ref 0.3–6.2)
ERYTHROCYTE [DISTWIDTH] IN BLOOD BY AUTOMATED COUNT: 14.1 % (ref 12.3–15.4)
GFR SERPL CREATININE-BSD FRML MDRD: 37 ML/MIN/1.73
GLOBULIN UR ELPH-MCNC: 3.4 GM/DL
GLUCOSE SERPL-MCNC: 175 MG/DL (ref 65–99)
HBA1C MFR BLD: 8 % (ref 3.5–5.6)
HCT VFR BLD AUTO: 38.7 % (ref 37.5–51)
HGB BLD-MCNC: 13.3 G/DL (ref 13–17.7)
IMM GRANULOCYTES # BLD AUTO: 0.01 10*3/MM3 (ref 0–0.05)
IMM GRANULOCYTES NFR BLD AUTO: 0.2 % (ref 0–0.5)
LYMPHOCYTES # BLD AUTO: 0.99 10*3/MM3 (ref 0.7–3.1)
LYMPHOCYTES NFR BLD AUTO: 18.1 % (ref 19.6–45.3)
MCH RBC QN AUTO: 29.4 PG (ref 26.6–33)
MCHC RBC AUTO-ENTMCNC: 34.4 G/DL (ref 31.5–35.7)
MCV RBC AUTO: 85.4 FL (ref 79–97)
MONOCYTES # BLD AUTO: 0.5 10*3/MM3 (ref 0.1–0.9)
MONOCYTES NFR BLD AUTO: 9.1 % (ref 5–12)
NEUTROPHILS NFR BLD AUTO: 3.97 10*3/MM3 (ref 1.7–7)
NEUTROPHILS NFR BLD AUTO: 72.4 % (ref 42.7–76)
NRBC BLD AUTO-RTO: 0 /100 WBC (ref 0–0.2)
PLATELET # BLD AUTO: 135 10*3/MM3 (ref 140–450)
PMV BLD AUTO: 12.5 FL (ref 6–12)
POTASSIUM SERPL-SCNC: 4.1 MMOL/L (ref 3.5–5.2)
PROT SERPL-MCNC: 7.3 G/DL (ref 6–8.5)
RBC # BLD AUTO: 4.53 10*6/MM3 (ref 4.14–5.8)
SODIUM SERPL-SCNC: 134 MMOL/L (ref 136–145)
WBC # BLD AUTO: 5.48 10*3/MM3 (ref 3.4–10.8)

## 2021-06-02 PROCEDURE — 80053 COMPREHEN METABOLIC PANEL: CPT | Performed by: FAMILY MEDICINE

## 2021-06-02 PROCEDURE — M0243 CASIRIVI AND IMDEVI INFUSION: HCPCS | Performed by: FAMILY MEDICINE

## 2021-06-02 PROCEDURE — 96365 THER/PROPH/DIAG IV INF INIT: CPT

## 2021-06-02 PROCEDURE — 71046 X-RAY EXAM CHEST 2 VIEWS: CPT

## 2021-06-02 PROCEDURE — 25010000006 INJECTION, CASIRIVIMAB AND IMDEVIMAB, 2400 MG: Performed by: FAMILY MEDICINE

## 2021-06-02 PROCEDURE — 85025 COMPLETE CBC W/AUTO DIFF WBC: CPT | Performed by: FAMILY MEDICINE

## 2021-06-02 PROCEDURE — 83036 HEMOGLOBIN GLYCOSYLATED A1C: CPT | Performed by: FAMILY MEDICINE

## 2021-06-02 PROCEDURE — 85379 FIBRIN DEGRADATION QUANT: CPT | Performed by: FAMILY MEDICINE

## 2021-06-02 RX ORDER — SODIUM CHLORIDE 9 MG/ML
50 INJECTION, SOLUTION INTRAVENOUS ONCE
Status: DISCONTINUED | OUTPATIENT
Start: 2021-06-02 | End: 2021-06-04 | Stop reason: HOSPADM

## 2021-06-02 RX ORDER — EPINEPHRINE 1 MG/ML
0.3 INJECTION, SOLUTION, CONCENTRATE INTRAVENOUS AS NEEDED
Status: DISCONTINUED | OUTPATIENT
Start: 2021-06-02 | End: 2021-06-04 | Stop reason: HOSPADM

## 2021-06-02 RX ORDER — METHYLPREDNISOLONE SODIUM SUCCINATE 125 MG/2ML
125 INJECTION, POWDER, LYOPHILIZED, FOR SOLUTION INTRAMUSCULAR; INTRAVENOUS AS NEEDED
Status: DISCONTINUED | OUTPATIENT
Start: 2021-06-02 | End: 2021-06-04 | Stop reason: HOSPADM

## 2021-06-02 RX ORDER — SODIUM CHLORIDE 9 MG/ML
50 INJECTION, SOLUTION INTRAVENOUS ONCE
Status: CANCELLED | OUTPATIENT
Start: 2021-06-02

## 2021-06-02 RX ORDER — EPINEPHRINE 1 MG/ML
0.3 INJECTION, SOLUTION, CONCENTRATE INTRAVENOUS AS NEEDED
Status: CANCELLED | OUTPATIENT
Start: 2021-06-02

## 2021-06-02 RX ORDER — METHYLPREDNISOLONE SODIUM SUCCINATE 125 MG/2ML
125 INJECTION, POWDER, LYOPHILIZED, FOR SOLUTION INTRAMUSCULAR; INTRAVENOUS AS NEEDED
Status: CANCELLED | OUTPATIENT
Start: 2021-06-02

## 2021-06-02 RX ORDER — DIPHENHYDRAMINE HYDROCHLORIDE 50 MG/ML
50 INJECTION INTRAMUSCULAR; INTRAVENOUS AS NEEDED
Status: CANCELLED | OUTPATIENT
Start: 2021-06-02

## 2021-06-02 RX ORDER — DIPHENHYDRAMINE HYDROCHLORIDE 50 MG/ML
50 INJECTION INTRAMUSCULAR; INTRAVENOUS AS NEEDED
Status: DISCONTINUED | OUTPATIENT
Start: 2021-06-02 | End: 2021-06-04 | Stop reason: HOSPADM

## 2021-06-02 RX ORDER — BENZONATATE 100 MG/1
100 CAPSULE ORAL 3 TIMES DAILY PRN
Qty: 45 CAPSULE | Refills: 0 | Status: SHIPPED | OUTPATIENT
Start: 2021-06-02 | End: 2021-06-29

## 2021-06-02 RX ORDER — AZITHROMYCIN 250 MG/1
TABLET, FILM COATED ORAL
Qty: 6 TABLET | Refills: 0 | Status: SHIPPED | OUTPATIENT
Start: 2021-06-02 | End: 2021-06-29

## 2021-06-02 RX ADMIN — IMDEVIMAB: 1332 INJECTION, SOLUTION, CONCENTRATE INTRAVENOUS at 08:38

## 2021-06-02 NOTE — CODE DOCUMENTATION
Spoke to sister in regards to coming in to go with patient to xray.  Sister is going to meet us outside of Endoscopy and walk with us

## 2021-06-02 NOTE — TELEPHONE ENCOUNTER
SPOKE WITH SIMA AND ADVISED HER THAT DR VEGA STATED THAT SHE NEEDED TO TAKE HIM TO THE ER.  SHE STATED SHE WOULD TAKE HIM TO [facility].

## 2021-06-02 NOTE — CODE DOCUMENTATION
Spoke to Noreen in xray and she stated as long as patient has a mask on it is ok to walk him down to xray.

## 2021-06-02 NOTE — TELEPHONE ENCOUNTER
PATIENT'S SISTER SIMA CALLED TO SPEAK TO DANNIELLE OR DR VEGA, HER BROTHER IS RUNNING A 104F FEVER    PLEASE ADVISE    [phone]

## 2021-06-25 NOTE — TELEPHONE ENCOUNTER
Date of last refill:03/03/2021    Is there an Inspect on file:N/A    Last appt date:03/02/2021     Next appt date:06/29/2021      Additional information:

## 2021-06-26 RX ORDER — ROSUVASTATIN CALCIUM 10 MG/1
TABLET, COATED ORAL
Qty: 90 TABLET | Refills: 0 | Status: SHIPPED | OUTPATIENT
Start: 2021-06-26 | End: 2021-11-03

## 2021-06-29 ENCOUNTER — OFFICE VISIT (OUTPATIENT)
Dept: FAMILY MEDICINE CLINIC | Facility: CLINIC | Age: 51
End: 2021-06-29

## 2021-06-29 VITALS
DIASTOLIC BLOOD PRESSURE: 75 MMHG | HEART RATE: 90 BPM | SYSTOLIC BLOOD PRESSURE: 150 MMHG | TEMPERATURE: 98.7 F | OXYGEN SATURATION: 98 % | WEIGHT: 257 LBS | HEIGHT: 65 IN | BODY MASS INDEX: 42.82 KG/M2

## 2021-06-29 DIAGNOSIS — L02.619 CELLULITIS AND ABSCESS OF FOOT: ICD-10-CM

## 2021-06-29 DIAGNOSIS — Z79.4 TYPE 2 DIABETES MELLITUS WITH HYPERGLYCEMIA, WITH LONG-TERM CURRENT USE OF INSULIN (HCC): Primary | ICD-10-CM

## 2021-06-29 DIAGNOSIS — Z12.11 COLON CANCER SCREENING: ICD-10-CM

## 2021-06-29 DIAGNOSIS — I10 ESSENTIAL HYPERTENSION: ICD-10-CM

## 2021-06-29 DIAGNOSIS — L03.119 CELLULITIS AND ABSCESS OF FOOT: ICD-10-CM

## 2021-06-29 DIAGNOSIS — Z23 IMMUNIZATION DUE: ICD-10-CM

## 2021-06-29 DIAGNOSIS — E66.01 CLASS 3 SEVERE OBESITY DUE TO EXCESS CALORIES WITH SERIOUS COMORBIDITY AND BODY MASS INDEX (BMI) OF 40.0 TO 44.9 IN ADULT (HCC): ICD-10-CM

## 2021-06-29 DIAGNOSIS — M1A.39X0 CHRONIC GOUT DUE TO RENAL IMPAIRMENT OF MULTIPLE SITES WITHOUT TOPHUS: ICD-10-CM

## 2021-06-29 DIAGNOSIS — E11.65 TYPE 2 DIABETES MELLITUS WITH HYPERGLYCEMIA, WITH LONG-TERM CURRENT USE OF INSULIN (HCC): Primary | ICD-10-CM

## 2021-06-29 DIAGNOSIS — H91.93 BILATERAL HEARING LOSS, UNSPECIFIED HEARING LOSS TYPE: ICD-10-CM

## 2021-06-29 DIAGNOSIS — E78.2 MIXED HYPERLIPIDEMIA: ICD-10-CM

## 2021-06-29 PROCEDURE — 99214 OFFICE O/P EST MOD 30 MIN: CPT | Performed by: FAMILY MEDICINE

## 2021-06-29 PROCEDURE — 82043 UR ALBUMIN QUANTITATIVE: CPT | Performed by: FAMILY MEDICINE

## 2021-06-29 RX ORDER — INSULIN GLARGINE 100 [IU]/ML
65 INJECTION, SOLUTION SUBCUTANEOUS DAILY
Qty: 20 PEN | Refills: 1 | Status: SHIPPED | OUTPATIENT
Start: 2021-06-29 | End: 2021-11-24 | Stop reason: SDUPTHER

## 2021-06-29 RX ORDER — BENZONATATE 200 MG/1
200 CAPSULE ORAL 3 TIMES DAILY PRN
Qty: 45 CAPSULE | Refills: 1 | Status: SHIPPED | OUTPATIENT
Start: 2021-06-29 | End: 2021-09-29

## 2021-06-30 LAB — ALBUMIN UR-MCNC: 96.1 MG/DL

## 2021-06-30 PROCEDURE — 84550 ASSAY OF BLOOD/URIC ACID: CPT | Performed by: FAMILY MEDICINE

## 2021-06-30 PROCEDURE — 80053 COMPREHEN METABOLIC PANEL: CPT | Performed by: FAMILY MEDICINE

## 2021-07-01 LAB
ALBUMIN SERPL-MCNC: 4.5 G/DL (ref 3.5–5.2)
ALBUMIN/GLOB SERPL: 1.6 G/DL
ALP SERPL-CCNC: 84 U/L (ref 39–117)
ALT SERPL W P-5'-P-CCNC: 22 U/L (ref 1–41)
ANION GAP SERPL CALCULATED.3IONS-SCNC: 13.7 MMOL/L (ref 5–15)
AST SERPL-CCNC: 24 U/L (ref 1–40)
BILIRUB SERPL-MCNC: 0.4 MG/DL (ref 0–1.2)
BUN SERPL-MCNC: 25 MG/DL (ref 6–20)
BUN/CREAT SERPL: 13 (ref 7–25)
CALCIUM SPEC-SCNC: 9.3 MG/DL (ref 8.6–10.5)
CHLORIDE SERPL-SCNC: 106 MMOL/L (ref 98–107)
CO2 SERPL-SCNC: 21.3 MMOL/L (ref 22–29)
CREAT SERPL-MCNC: 1.93 MG/DL (ref 0.76–1.27)
GFR SERPL CREATININE-BSD FRML MDRD: 37 ML/MIN/1.73
GLOBULIN UR ELPH-MCNC: 2.8 GM/DL
GLUCOSE SERPL-MCNC: 85 MG/DL (ref 65–99)
POTASSIUM SERPL-SCNC: 4.6 MMOL/L (ref 3.5–5.2)
PROT SERPL-MCNC: 7.3 G/DL (ref 6–8.5)
SODIUM SERPL-SCNC: 141 MMOL/L (ref 136–145)
URATE SERPL-MCNC: 3 MG/DL (ref 3.4–7)

## 2021-07-06 ENCOUNTER — TELEPHONE (OUTPATIENT)
Dept: FAMILY MEDICINE CLINIC | Facility: CLINIC | Age: 51
End: 2021-07-06

## 2021-07-06 NOTE — TELEPHONE ENCOUNTER
----- Message from Barbi Vega MD sent at 7/6/2021  1:57 AM EDT -----  Kidney function decreased but stable, send labs to his nephrologist  Continue meds at same dose  
CALLED AND S/W PATIENT'S SISTER SIMA. PATIENT'S IDENTITY VERIFIED. ADVISED HER OF PROVIDER'S RESPONSE RE: LAB RESULTS. SHE VOICED UNDERSTANDING. SHE WILL CALL DR. FLOWERS'S OFFICE TO SEE WHEN HE WAS LAST SEEN, AND WHEN HE NEEDS TO BE SEEN AGAIN SINCE PT'S MOTHER RECENTLY PASSED AWAY. LABS TO BE SENT TO DR. FLOWERS PER PROVIDER'S REQUEST FOR CONTINUATION OF CARE.   
Stitches, Staples, or Adhesive Wound Closure  ImageDoctors use stitches (sutures), staples, and certain glue (skin adhesives) to hold your skin together while it heals (wound closure). You may need this treatment after you have surgery or if you cut your skin accidentally. These methods help your skin heal more quickly. They also make it less likely that you will have a scar.    What are the different kinds of wound closures?  There are many options for wound closure. The one that your doctor uses depends on how deep and large your wound is.    Adhesive Glue     To use this glue to close a wound, your doctor holds the edges of the wound together and paints the glue on the surface of your skin. You may need more than one layer of glue. Then the wound may be covered with a light bandage (dressing).    This type of skin closure may be used for small wounds that are not deep (superficial). Using glue for wound closure is less painful than other methods. It does not require a medicine that numbs the area. This method also leaves nothing to be removed. Adhesive glue is often used for children and on facial wounds.    Adhesive glue cannot be used for wounds that are deep, uneven, or bleeding. It is not used inside of a wound.    Adhesive Strips     These strips are made of sticky (adhesive), porous paper. They are placed across your skin edges like a regular adhesive bandage. You leave them on until they fall off.    Adhesive strips may be used to close very superficial wounds. They may also be used along with sutures to improve closure of your skin edges.    Sutures     Sutures are the oldest method of wound closure. Sutures can be made from natural or synthetic materials. They can be made from a material that your body can break down as your wound heals (absorbable), or they can be made from a material that needs to be removed from your skin (nonabsorbable). They come in many different strengths and sizes.    Your doctor attaches the sutures to a steel needle on one end. Sutures can be passed through your skin, or through the tissues beneath your skin. Then they are tied and cut. Your skin edges may be closed in one continuous stitch or in separate stitches.    Sutures are strong and can be used for all kinds of wounds. Absorbable sutures may be used to close tissues under the skin. The disadvantage of sutures is that they may cause skin reactions that lead to infection. Nonabsorbable sutures need to be removed.    Staples     When surgical staples are used to close a wound, the edges of your skin on both sides of the wound are brought close together. A staple is placed across the wound, and an instrument secures the edges together. Staples are often used to close surgical cuts (incisions).    Staples are faster to use than sutures, and they cause less reaction from your skin. Staples need to be removed using a tool that bends the staples away from your skin.    How do I care for my wound closure?  Take medicines only as told by your doctor.  If you were prescribed an antibiotic medicine for your wound, finish it all even if you start to feel better.  Use ointments or creams only as told by your doctor.  Wash your hands with soap and water before and after touching your wound.  Do not soak your wound in water. Do not take baths, swim, or use a hot tub until your doctor says it is okay.  Ask your doctor when you can start showering. Cover your wound if told by your doctor.  Do not take out your own sutures or staples.  Do not pick at your wound. Picking can cause an infection.  Keep all follow-up visits as told by your doctor. This is important.  How long will I have my wound closure?  Leave adhesive glue on your skin until the glue peels away.  Leave adhesive strips on your skin until they fall off.  Absorbable sutures will dissolve within several days.  Nonabsorbable sutures and staples must be removed. The location of the wound will determine how long they stay in. This can range from several days to a couple of weeks.    YOUR [facility] WOUND NEEDS FOLLOW UP FOR A WOUND CHECK, SUTURE REMOVAL OR STAPLE REMOVAL IN  ______ DAYS    IF YOU HAD SUTURES WERE PLACED TODAY:  _________ SUTURES WERE PLACED  When should I seek help for my wound closure?  Contact your doctor if:    You have a fever.  You have chills.  You have redness, puffiness (swelling), or pain at the site of your wound.  You have fluid, blood, or pus coming from your wound.  There is a bad smell coming from your wound.  The skin edges of your wound start to separate after your sutures have been removed.  Your wound becomes thick, raised, and darker in color after your sutures come out (scarring).    This information is not intended to replace advice given to you by your health care provider. Make sure you discuss any questions you have with your health care provider.

## 2021-07-15 ENCOUNTER — TELEPHONE (OUTPATIENT)
Dept: FAMILY MEDICINE CLINIC | Facility: CLINIC | Age: 51
End: 2021-07-15

## 2021-07-15 NOTE — TELEPHONE ENCOUNTER
Patients sister called and they are concerned with Sam's sugar lvls, she states that he is on insulin and his sugars for the past week have been in the 60's. Please advise

## 2021-07-15 NOTE — TELEPHONE ENCOUNTER
SISTER SIMA IS UNAWARE OF HOW MUCH INSULIN HE IS CURRENTLY TAKING. PRIOR TO STOPPING TRADJENTA DUE TO INSURANCE, HIS BLOOD SUGAR WAS AVERAGING IN 'S, AND AFTER RE-STARTING TRADJENTA, BLOOD SUGARS ARE NOW IN THE 60'S TO 70'S. SISTER DENIES ANY S/S OF HYPOGLYCEMIA. SHE BELIEVES THAT PT IS DOING THE PRESCRIBED AMOUNT OF THE OZEMPIC AND THE BASAGLAR. SHE DID ADVISE THAT PATIENT DID START A NEW DIET ABOUT A WEEK AGO (DECREASING SUGARS, SODAS, EATING LESS). PLEASE ADVISE.

## 2021-07-16 ENCOUNTER — TELEPHONE (OUTPATIENT)
Dept: FAMILY MEDICINE CLINIC | Facility: CLINIC | Age: 51
End: 2021-07-16

## 2021-07-16 NOTE — TELEPHONE ENCOUNTER
Records show that he was taking 65 units insulin  Recommend recording his blood sugars 3x a day and how much insulin he is taking so we can make changes if needed

## 2021-07-16 NOTE — TELEPHONE ENCOUNTER
----- Message from Sam Prakash sent at 7/15/2021  4:21 PM EDT -----  Regarding: Non-Urgent Medical Question  Contact: [phone]  I was wondering if it would be possible for Kory to have a Freestyle [place] for keeping track of his sugar.  Since the passing of our mother we are trying to keep track of his sugar and medical needs.  He takes his sugar about 1 x a day and logs it but we would like to monitor it more closely.  We would like him to take it 3x a day. Would you think this might help us?  I appreciate your consideration in getting back with us.    Thanks,    Scarlett Benson (sister)

## 2021-07-16 NOTE — TELEPHONE ENCOUNTER
We can arrange to have him meet diabetes educator to consider a pump and a cg,m through [facility]. She comes on wednesdays

## 2021-07-16 NOTE — TELEPHONE ENCOUNTER
Recordsreadings and howmuch insulin, may cut down to 30 units 2x a dayif running low  Call us next week

## 2021-07-19 NOTE — TELEPHONE ENCOUNTER
PATIENT/SISTER TO BE NOTIFIED VIA [employer] AS TO HOW THE INITIAL COMMUNICATION CAME INTO OFFICE.

## 2021-08-03 ENCOUNTER — TELEPHONE (OUTPATIENT)
Dept: FAMILY MEDICINE CLINIC | Facility: CLINIC | Age: 51
End: 2021-08-03

## 2021-08-03 NOTE — TELEPHONE ENCOUNTER
----- Message from Sam Prakash sent at 8/2/2021 12:05 PM EDT -----  Regarding: Non-Urgent Medical Question  Contact: [phone]  Marilu sugar has been steadily running mid 80's to 120.  Usually 90's in the morning and sometimes 120 to 130 in afternoons and evenings, mostly he is averaging in the 90's for the past week.  He is taking 55 units in the morning and his Ozempic 1 x a week.     We have him now on a schedule of taking his sugar more than 1 x a day so I think we are good on that part. He does not want the bump so I think we just need to know if we need to do anything else or if what his doing currently what you think is best.  He is really doing good and his sugar has been steady.  He is dieting and keeping active which I think has helped a lot.  Let us know if you want to make any changes or advise differently.    Thanks,    Scarlett Benson (sister)

## 2021-08-04 NOTE — TELEPHONE ENCOUNTER
Discussed with sister Scarlett Benson  Will dc ozempic and continue Basaglar  Monitor blood glucose as directed, will adjust as Ozempic gets out of system

## 2021-08-09 ENCOUNTER — TELEPHONE (OUTPATIENT)
Dept: FAMILY MEDICINE CLINIC | Facility: CLINIC | Age: 51
End: 2021-08-09

## 2021-08-09 RX ORDER — AMLODIPINE BESYLATE 10 MG/1
TABLET ORAL
Qty: 90 TABLET | Refills: 1 | Status: SHIPPED | OUTPATIENT
Start: 2021-08-09 | End: 2021-11-07 | Stop reason: SDUPTHER

## 2021-08-09 NOTE — TELEPHONE ENCOUNTER
----- Message from Sam ZIEGLEREnzo Olinda sent at 8/9/2021  2:45 PM EDT -----  Regarding: Non-Urgent Medical Question  Contact: [phone]  Thanks for your call.  We were hoping for Kory's motivation to diet and eat better would last longer but it has not and my sister is afraid that not taking the Ozempic will not be good for him.  The last several days his sugar has been averaging 165.       Should we keep the Ozempic?    Thanks, Scarlett

## 2021-08-10 NOTE — TELEPHONE ENCOUNTER
PATIENT'S SISTER, SIMA NOTIFIED VIA [employer] MESSAGE AS TO HOW THAT IS THE ORIGINAL REQUEST CAME INTO OFFICE. MED LIST UPDATED.

## 2021-08-18 RX ORDER — ERGOCALCIFEROL 1.25 MG/1
50000 CAPSULE ORAL
Qty: 12 CAPSULE | Refills: 0 | Status: SHIPPED | OUTPATIENT
Start: 2021-08-18 | End: 2021-11-24

## 2021-08-24 RX ORDER — ALLOPURINOL 100 MG/1
TABLET ORAL
Qty: 540 TABLET | Refills: 0 | Status: SHIPPED | OUTPATIENT
Start: 2021-08-24

## 2021-08-24 RX ORDER — CLONIDINE HYDROCHLORIDE 0.2 MG/1
TABLET ORAL
Qty: 180 TABLET | Refills: 1 | Status: SHIPPED | OUTPATIENT
Start: 2021-08-24

## 2021-08-24 RX ORDER — LOSARTAN POTASSIUM 100 MG/1
TABLET ORAL
Qty: 90 TABLET | Refills: 0 | Status: SHIPPED | OUTPATIENT
Start: 2021-08-24

## 2021-08-24 RX ORDER — HYDRALAZINE HYDROCHLORIDE 25 MG/1
TABLET, FILM COATED ORAL
Qty: 270 TABLET | Refills: 1 | Status: SHIPPED | OUTPATIENT
Start: 2021-08-24

## 2021-09-29 ENCOUNTER — OFFICE VISIT (OUTPATIENT)
Dept: FAMILY MEDICINE CLINIC | Facility: CLINIC | Age: 51
End: 2021-09-29

## 2021-09-29 VITALS
TEMPERATURE: 98.4 F | SYSTOLIC BLOOD PRESSURE: 162 MMHG | HEART RATE: 91 BPM | DIASTOLIC BLOOD PRESSURE: 85 MMHG | BODY MASS INDEX: 43.82 KG/M2 | OXYGEN SATURATION: 99 % | HEIGHT: 65 IN | WEIGHT: 263 LBS

## 2021-09-29 DIAGNOSIS — E11.65 TYPE 2 DIABETES MELLITUS WITH HYPERGLYCEMIA, WITH LONG-TERM CURRENT USE OF INSULIN (HCC): Primary | ICD-10-CM

## 2021-09-29 DIAGNOSIS — H91.93 BILATERAL HEARING LOSS, UNSPECIFIED HEARING LOSS TYPE: ICD-10-CM

## 2021-09-29 DIAGNOSIS — J01.10 ACUTE NON-RECURRENT FRONTAL SINUSITIS: ICD-10-CM

## 2021-09-29 DIAGNOSIS — E79.0 HYPERURICEMIA: ICD-10-CM

## 2021-09-29 DIAGNOSIS — N18.32 STAGE 3B CHRONIC KIDNEY DISEASE (HCC): ICD-10-CM

## 2021-09-29 DIAGNOSIS — I10 ESSENTIAL HYPERTENSION: ICD-10-CM

## 2021-09-29 DIAGNOSIS — N18.30 STAGE 3 CHRONIC KIDNEY DISEASE, UNSPECIFIED WHETHER STAGE 3A OR 3B CKD (HCC): ICD-10-CM

## 2021-09-29 DIAGNOSIS — Z79.4 TYPE 2 DIABETES MELLITUS WITH HYPERGLYCEMIA, WITH LONG-TERM CURRENT USE OF INSULIN (HCC): Primary | ICD-10-CM

## 2021-09-29 LAB
ALBUMIN SERPL-MCNC: 4.5 G/DL (ref 3.5–5.2)
ALBUMIN/GLOB SERPL: 1.6 G/DL
ALP SERPL-CCNC: 80 U/L (ref 39–117)
ALT SERPL W P-5'-P-CCNC: 30 U/L (ref 1–41)
ANION GAP SERPL CALCULATED.3IONS-SCNC: 11.1 MMOL/L (ref 5–15)
AST SERPL-CCNC: 22 U/L (ref 1–40)
BASOPHILS # BLD AUTO: 0.07 10*3/MM3 (ref 0–0.2)
BASOPHILS NFR BLD AUTO: 0.8 % (ref 0–1.5)
BILIRUB SERPL-MCNC: 0.2 MG/DL (ref 0–1.2)
BUN SERPL-MCNC: 35 MG/DL (ref 6–20)
BUN/CREAT SERPL: 16.7 (ref 7–25)
CALCIUM SPEC-SCNC: 9.3 MG/DL (ref 8.6–10.5)
CHLORIDE SERPL-SCNC: 103 MMOL/L (ref 98–107)
CO2 SERPL-SCNC: 24.9 MMOL/L (ref 22–29)
CREAT SERPL-MCNC: 2.09 MG/DL (ref 0.76–1.27)
DEPRECATED RDW RBC AUTO: 40.5 FL (ref 37–54)
EOSINOPHIL # BLD AUTO: 0.19 10*3/MM3 (ref 0–0.4)
EOSINOPHIL NFR BLD AUTO: 2.1 % (ref 0.3–6.2)
ERYTHROCYTE [DISTWIDTH] IN BLOOD BY AUTOMATED COUNT: 12.7 % (ref 12.3–15.4)
GFR SERPL CREATININE-BSD FRML MDRD: 34 ML/MIN/1.73
GLOBULIN UR ELPH-MCNC: 2.9 GM/DL
GLUCOSE SERPL-MCNC: 256 MG/DL (ref 65–99)
HBA1C MFR BLD: 7.3 % (ref 3.5–5.6)
HCT VFR BLD AUTO: 43.3 % (ref 37.5–51)
HGB BLD-MCNC: 14.8 G/DL (ref 13–17.7)
IMM GRANULOCYTES # BLD AUTO: 0.04 10*3/MM3 (ref 0–0.05)
IMM GRANULOCYTES NFR BLD AUTO: 0.4 % (ref 0–0.5)
LYMPHOCYTES # BLD AUTO: 2.15 10*3/MM3 (ref 0.7–3.1)
LYMPHOCYTES NFR BLD AUTO: 23.3 % (ref 19.6–45.3)
MCH RBC QN AUTO: 30.2 PG (ref 26.6–33)
MCHC RBC AUTO-ENTMCNC: 34.2 G/DL (ref 31.5–35.7)
MCV RBC AUTO: 88.4 FL (ref 79–97)
MONOCYTES # BLD AUTO: 0.77 10*3/MM3 (ref 0.1–0.9)
MONOCYTES NFR BLD AUTO: 8.3 % (ref 5–12)
NEUTROPHILS NFR BLD AUTO: 6.02 10*3/MM3 (ref 1.7–7)
NEUTROPHILS NFR BLD AUTO: 65.1 % (ref 42.7–76)
NRBC BLD AUTO-RTO: 0 /100 WBC (ref 0–0.2)
PLATELET # BLD AUTO: 209 10*3/MM3 (ref 140–450)
PMV BLD AUTO: 12.3 FL (ref 6–12)
POTASSIUM SERPL-SCNC: 5 MMOL/L (ref 3.5–5.2)
PROT SERPL-MCNC: 7.4 G/DL (ref 6–8.5)
RBC # BLD AUTO: 4.9 10*6/MM3 (ref 4.14–5.8)
SODIUM SERPL-SCNC: 139 MMOL/L (ref 136–145)
URATE SERPL-MCNC: 4.3 MG/DL (ref 3.4–7)
WBC # BLD AUTO: 9.24 10*3/MM3 (ref 3.4–10.8)

## 2021-09-29 PROCEDURE — 80053 COMPREHEN METABOLIC PANEL: CPT | Performed by: FAMILY MEDICINE

## 2021-09-29 PROCEDURE — 83036 HEMOGLOBIN GLYCOSYLATED A1C: CPT | Performed by: FAMILY MEDICINE

## 2021-09-29 PROCEDURE — 99214 OFFICE O/P EST MOD 30 MIN: CPT | Performed by: FAMILY MEDICINE

## 2021-09-29 PROCEDURE — 85025 COMPLETE CBC W/AUTO DIFF WBC: CPT | Performed by: FAMILY MEDICINE

## 2021-09-29 PROCEDURE — 84550 ASSAY OF BLOOD/URIC ACID: CPT | Performed by: FAMILY MEDICINE

## 2021-09-29 RX ORDER — DAPAGLIFLOZIN 10 MG/1
1 TABLET, FILM COATED ORAL DAILY
Qty: 30 TABLET | Refills: 1 | Status: SHIPPED | OUTPATIENT
Start: 2021-09-29 | End: 2021-11-24 | Stop reason: DRUGHIGH

## 2021-09-29 RX ORDER — DOXYCYCLINE HYCLATE 100 MG
100 TABLET ORAL 2 TIMES DAILY
Qty: 20 TABLET | Refills: 0 | Status: SHIPPED | OUTPATIENT
Start: 2021-09-29 | End: 2021-10-09

## 2021-09-29 RX ORDER — BLOOD SUGAR DIAGNOSTIC
1 STRIP MISCELLANEOUS DAILY
Qty: 100 EACH | Refills: 1 | Status: SHIPPED | OUTPATIENT
Start: 2021-09-29

## 2021-10-01 PROBLEM — E11.8 TYPE 2 DIABETES MELLITUS WITH COMPLICATIONS (HCC): Status: ACTIVE | Noted: 2021-10-01

## 2021-10-04 DIAGNOSIS — Z79.4 TYPE 2 DIABETES MELLITUS WITH HYPERGLYCEMIA, WITH LONG-TERM CURRENT USE OF INSULIN (HCC): ICD-10-CM

## 2021-10-04 DIAGNOSIS — E11.65 TYPE 2 DIABETES MELLITUS WITH HYPERGLYCEMIA, WITH LONG-TERM CURRENT USE OF INSULIN (HCC): ICD-10-CM

## 2021-10-04 NOTE — TELEPHONE ENCOUNTER
Rx Refill Note  Requested Prescriptions     Pending Prescriptions Disp Refills   • Tradjenta 5 MG tablet tablet [Pharmacy Med Name: TRADJENTA 5 MG TABLET] 90 tablet 0     Sig: TAKE 1 TABLET BY MOUTH EVERY DAY      Last office visit with prescribing clinician: 9/29/2021      Next office visit with prescribing clinician: 1/26/2022     Comprehensive metabolic panel (09/29/2021 15:06)  Hemoglobin A1c (09/29/2021 15:06)         Mirella Lugo LPN  10/04/21, 08:01 EDT

## 2021-10-05 RX ORDER — LINAGLIPTIN 5 MG/1
TABLET, FILM COATED ORAL
Qty: 90 TABLET | Refills: 0 | Status: SHIPPED | OUTPATIENT
Start: 2021-10-05

## 2021-10-22 RX ORDER — BENZONATATE 200 MG/1
200 CAPSULE ORAL 3 TIMES DAILY PRN
Qty: 45 CAPSULE | Refills: 1 | OUTPATIENT
Start: 2021-10-22

## 2021-10-26 ENCOUNTER — TELEPHONE (OUTPATIENT)
Dept: FAMILY MEDICINE CLINIC | Facility: CLINIC | Age: 51
End: 2021-10-26

## 2021-10-26 RX ORDER — CEPHALEXIN 500 MG/1
500 CAPSULE ORAL 3 TIMES DAILY
Qty: 30 CAPSULE | Refills: 0 | Status: SHIPPED | OUTPATIENT
Start: 2021-10-26 | End: 2021-11-24

## 2021-10-26 NOTE — TELEPHONE ENCOUNTER
----- Message from Barbi Vega MD sent at 10/6/2021  8:44 AM EDT -----  Creainine  2.09, GFR 34 showing worsening kidney function,  be sure to fu with nephrologist  A1c 7.3 DM controlled  Uric acid  4.3 controlled

## 2021-10-26 NOTE — TELEPHONE ENCOUNTER
Caller: SIMA PINO    Relationship: Emergency Contact    Best call back number: [phone]    What medication are you requesting: ANTIBIOTIC    What are your current symptoms: SINUS PRESSURE    How long have you been experiencing symptoms:  SINCE 10/23/21    Have you had these symptoms before:    [x] Yes  [] No    Have you been treated for these symptoms before:   [x] Yes  [] No    If a prescription is needed, what is your preferred pharmacy and phone number:    [id number] [place] - [address] [place] [address] - [phone]  - [phone]   [phone]      ADDITIONAL NOTES: CAN MESSAGE BACK ON [place] OR CALL

## 2021-11-03 RX ORDER — ROSUVASTATIN CALCIUM 10 MG/1
TABLET, COATED ORAL
Qty: 90 TABLET | Refills: 0 | Status: SHIPPED | OUTPATIENT
Start: 2021-11-03 | End: 2021-11-07 | Stop reason: SDUPTHER

## 2021-11-08 RX ORDER — AMLODIPINE BESYLATE 10 MG/1
10 TABLET ORAL DAILY
Qty: 90 TABLET | Refills: 1 | Status: SHIPPED | OUTPATIENT
Start: 2021-11-08

## 2021-11-08 RX ORDER — ROSUVASTATIN CALCIUM 10 MG/1
10 TABLET, COATED ORAL DAILY
Qty: 90 TABLET | Refills: 0 | Status: SHIPPED | OUTPATIENT
Start: 2021-11-08

## 2021-11-11 ENCOUNTER — TELEPHONE (OUTPATIENT)
Dept: FAMILY MEDICINE CLINIC | Facility: CLINIC | Age: 51
End: 2021-11-11

## 2021-11-11 NOTE — TELEPHONE ENCOUNTER
PATIENT STATES:THAT HE WOULD LIKE A CALL BACK TO GO OVER hydrALAZINE (APRESOLINE) 25 MG tablet HE THOUGHT IT  MG NOT 25 MG PLEASE ADVISE     PATIENT CAN BE REACHED ON: [phone]

## 2021-11-17 ENCOUNTER — TELEPHONE (OUTPATIENT)
Dept: FAMILY MEDICINE CLINIC | Facility: CLINIC | Age: 51
End: 2021-11-17

## 2021-11-17 NOTE — TELEPHONE ENCOUNTER
Caller: SIMA PINO    Relationship to patient: Emergency Contact    Best call back number: [phone]    Chief complaint: NOT ACTING RIGHT, HEARING VOICES, TAKING A LONG TIME TO GET READY, NOT UNDERSTAND THINGS LIKE NORMAL    Type of visit: HOSPITAL FOLLOW UP    Requested date: ASAP-WANTS TODAY    Additional notes: NO OPENINGS WITH DR VEGA, ATTEMPTED WARM TRANSFER, NO ANSWER. PATIENT WENT TO [facility] ON Monday 11/15.

## 2021-11-17 NOTE — TELEPHONE ENCOUNTER
CALLED AND S/W SIMA PINO. INQUIRED AS TO WHAT HE VISITED [facility] ER FOR ON 11/15. SHE STATES THAT IT WAS DUE TO THE SAME ISSUE THAT IS CURRENTLY GOING ON. INQUIRED AS TO WHAT THE PT HAD BEEN DIAGNOSED WITH. SIMA ADVISED THAT PT HAD A CT, EKG, LABS, AND A RENAL SCAN - WITH ALL OF THAT BEING OK. SIMA CONTINUED TO VOICE THAT THE PROVIDER AT [facility] THAT SEEN HIM THOUGHT THAT HE WAS TRYING TO WORK THROUGH GRIEF. DR. VEGA IS AWARE AND WAS STANDING BY THIS NURSE WHILE ON THE PHONE WITH SIMA. PER DR. VEGA, PT NEEDS TO GO TO ER, AND IF SHE (SIMA) THINKS THAT IT COULD BE PSYCH RELATED, THEN TO TAKE PT TO [facility] ER DUE TO THEM HAVING A BEHAVIORAL UNIT. SIMA WAS ADVISED TO LET US KNOW WHERE SHE TOOK PT TO BE SEEN. SHE VOICED UNDERSTANDING.

## 2021-11-18 DIAGNOSIS — E11.9 TYPE 2 DIABETES MELLITUS WITHOUT COMPLICATIONS (HCC): ICD-10-CM

## 2021-11-18 RX ORDER — BLOOD-GLUCOSE METER
KIT MISCELLANEOUS
Qty: 100 EACH | Refills: 7 | Status: SHIPPED | OUTPATIENT
Start: 2021-11-18

## 2021-11-19 ENCOUNTER — TELEPHONE (OUTPATIENT)
Dept: FAMILY MEDICINE CLINIC | Facility: CLINIC | Age: 51
End: 2021-11-19

## 2021-11-19 NOTE — TELEPHONE ENCOUNTER
"Caller: SIMA PINO    Relationship: Emergency Contact    Best call back number: [phone]    What is the best time to reach you: ANYTIME    Who are you requesting to speak with (clinical staff, provider,  specific staff member): WENDY VALDEZ OR MEDICAL ASSISTANT    Do you know the name of the person who called: N/A    What was the call regarding: PATIENT IS HAVING EPISODES OF BEING \"SPACY\" AND LIKE HE'S FADING IN AND OUT. WHEN HE \"WAKES UP\" HE IS FINE AND CAN FUNCTION BUT SIMA IS WORRIED HE IS HAVING SOME KIND OF SEIZURE.     SIMA WOULD LIKE TO KNOW IF DR VEGA WOULD ORDER A MRI OR EEG TO SEE IF THERE IS SOMETHING GOING ON THERE. SHE WOULD LIKE TO RULE OUT ANYTHING BEFORE SEDATIVE MEDICATIONS ARE PUT IN PLACE.      SIMA IS ALSO CONCERNED ABOUT HIM TAKING HIS MEDICATION. HE IS ON INSULIN AND SHE IS WORRIED ABOUT HIM HANDLING THIS MEDICATION ON HIS OWN WITH THE EPISODES GOING ON. WHEN HE COMES BACK TO HIMSELF, HE JUST STARTS DOING HIS OWN THING. SHE IS CONCERNED HE MIGHT GIVE HIMSELF TOO MUCH INSULIN.     PLEASE CALL PATIENT'S SISTER SIMA TO ADVISE ON WHAT IS THE NEXT STEPS FOR HER BROTHER.           "

## 2021-11-19 NOTE — TELEPHONE ENCOUNTER
CALL WAS RECEIVED EARLIER THIS AM BY ARMANI, NURSE  AT [facility]. ARMANI ADVISED THAT DR. LU IS WANTING TO SPEAK WITH DR. VEGA IN REGARDS TO THIS PT. DR. VEGA IS TO CALL [phone]. DR. VEGA MADE AWARE, AND SHE WILL CONTACT THE OTHER PHYSICIAN.

## 2021-11-19 NOTE — TELEPHONE ENCOUNTER
DR. VEGA,   PLEASE ADVISE. I HAVE CALLED AND REQUESTED ALL RECENT HOSPITAL RECORDS FROM [facility]. THANK YOU!

## 2021-11-24 ENCOUNTER — OFFICE VISIT (OUTPATIENT)
Dept: FAMILY MEDICINE CLINIC | Facility: CLINIC | Age: 51
End: 2021-11-24

## 2021-11-24 VITALS
BODY MASS INDEX: 41.99 KG/M2 | SYSTOLIC BLOOD PRESSURE: 176 MMHG | WEIGHT: 252 LBS | OXYGEN SATURATION: 99 % | TEMPERATURE: 98 F | HEIGHT: 65 IN | DIASTOLIC BLOOD PRESSURE: 86 MMHG | HEART RATE: 104 BPM

## 2021-11-24 DIAGNOSIS — N18.32 STAGE 3B CHRONIC KIDNEY DISEASE (HCC): ICD-10-CM

## 2021-11-24 DIAGNOSIS — H91.93 BILATERAL HEARING LOSS, UNSPECIFIED HEARING LOSS TYPE: ICD-10-CM

## 2021-11-24 DIAGNOSIS — I10 PRIMARY HYPERTENSION: ICD-10-CM

## 2021-11-24 DIAGNOSIS — E11.65 TYPE 2 DIABETES MELLITUS WITH HYPERGLYCEMIA, WITH LONG-TERM CURRENT USE OF INSULIN (HCC): ICD-10-CM

## 2021-11-24 DIAGNOSIS — Z79.4 TYPE 2 DIABETES MELLITUS WITH HYPERGLYCEMIA, WITH LONG-TERM CURRENT USE OF INSULIN (HCC): ICD-10-CM

## 2021-11-24 DIAGNOSIS — R41.82 ACUTE ON CHRONIC ALTERATION IN MENTAL STATUS: Primary | ICD-10-CM

## 2021-11-24 DIAGNOSIS — F43.21 GRIEF REACTION: ICD-10-CM

## 2021-11-24 PROCEDURE — 99214 OFFICE O/P EST MOD 30 MIN: CPT | Performed by: FAMILY MEDICINE

## 2021-11-24 RX ORDER — SEMAGLUTIDE 1.34 MG/ML
1 INJECTION, SOLUTION SUBCUTANEOUS WEEKLY
Qty: 4 PEN | Refills: 1 | Status: SHIPPED | OUTPATIENT
Start: 2021-11-24

## 2021-11-24 RX ORDER — OLANZAPINE 10 MG/1
10 TABLET ORAL NIGHTLY
Qty: 30 TABLET | Refills: 1 | Status: SHIPPED | OUTPATIENT
Start: 2021-11-24

## 2021-11-24 RX ORDER — OLANZAPINE 5 MG/1
TABLET ORAL
COMMUNITY
Start: 2021-11-19 | End: 2021-11-24

## 2021-11-24 RX ORDER — BENZONATATE 200 MG/1
CAPSULE ORAL
COMMUNITY
Start: 2021-10-07

## 2021-11-24 RX ORDER — FLASH GLUCOSE SENSOR
1 KIT MISCELLANEOUS DAILY
Qty: 1 EACH | Refills: 0 | COMMUNITY
Start: 2021-11-24

## 2021-11-24 RX ORDER — INSULIN GLARGINE 100 [IU]/ML
INJECTION, SOLUTION SUBCUTANEOUS
COMMUNITY
Start: 2021-10-07 | End: 2021-11-24 | Stop reason: ALTCHOICE

## 2021-11-24 NOTE — PROGRESS NOTES
Subjective   Sam Prakash is a 51 y.o. male.     Chief Complaint   Patient presents with   • Hospital Follow Up Visit     ER [facility] on 11/19/21  for elevated BP and heart rate       History of Present Illness   Current hx DM, CKD, HTN, Hyperuricemia, HLD, Atheroscleosis, obesity  PMHX covid 19  Pt here with his 2 sisters after being evaluated at the [facility] ER for behavioral changes,auditory hallucinations.  This was discussed with the provider in the ER and she felt that his symptomd were likely related to grief after his mom's recent death from Covid. He was started on Zyprexa 5 mg which will now be increased to 10 mg.  The sisters reports that he has not been monitoring his blood glucose regulary and and not taking his meds as directed.  They are concerned that he may take too much insulin and have asked that he  Stop his Basaglar and be tried on non-insulin meds  He is to continue Tradjenta, was ampled Farxiga 5 mg samples totake daily and is to start Ozempic 1 mg once a week.  He was sampled with freestyle ulices sensors to help with glucose monitoring.  Since he has CKD he cannot take metformin  The sisters report that he has episodes of possible seizures where he is spaced out  Discussed considering counseling for him in the [facility] as he is closer to that area.  An EEG will be scheduled and MRI has been ordered.  A referral to a neurology will be considered  Pt does have hearing deficits which can complicate counseling  He did get Covid 19 in June 2021 at the same time his mom did and she did not survive.  They report that his symptoms started after he got the Covid vaccine  The sisters are alsolo concerned about his blood pressure and heart rate.  His clonicine will be increased to 2 in the morning and 1 at night, will continue hydralazine and losartan    The following portions of the patient's history were reviewed and updated as appropriate: allergies, current medications, past family  history, past medical history, past social history, past surgical history and problem list.    Past Medical History:   Diagnosis Date   • CKD (chronic kidney disease)    • Diabetes mellitus (HCC)    • Glaucoma    • Gout    • Gout    • Hard of hearing    • Hearing loss    • Heart disease    • History of MI (myocardial infarction)    • History of type 2 diabetes mellitus    • Hyperlipidemia    • Hypertension    • Myocardial infarction (HCC)    • Obesity        Past Surgical History:   Procedure Laterality Date   • CATARACT EXTRACTION Bilateral 05/2015   • OTHER SURGICAL HISTORY  12/2014    [facility] HEART STENT       Family History   Problem Relation Age of Onset   • Heart disease Father    • Other Father         HEART ATTACK IN 2008   • Arthritis Maternal Grandmother    • Diabetes Maternal Grandfather    • Heart disease Maternal Grandfather    • Hypertension Maternal Grandfather    • Diabetes Paternal Grandfather    • Hyperlipidemia Paternal Grandfather    • Hypertension Paternal Grandfather        Review of Systems   Constitutional: Negative for fatigue, unexpected weight gain and unexpected weight loss.   HENT: Negative for congestion, sinus pressure and sore throat.         Normal smell/taste   Eyes: Negative for blurred vision and visual disturbance.   Respiratory: Negative for cough, shortness of breath and wheezing.    Cardiovascular: Negative for chest pain, palpitations and leg swelling.   Gastrointestinal: Negative for abdominal pain, constipation, diarrhea, nausea, vomiting, GERD and indigestion.   Musculoskeletal: Negative for arthralgias, back pain, gait problem, joint swelling and neck pain.   Skin: Negative for rash, skin lesions and bruise.   Allergic/Immunologic: Negative for environmental allergies.   Neurological: Negative for dizziness, tremors, syncope, weakness, light-headedness, headache and memory problem.   Psychiatric/Behavioral: Positive for behavioral problems, hallucinations,  sleep disturbance and stress. Negative for depressed mood. The patient is not nervous/anxious.        Objective   Physical Exam  Vitals and nursing note reviewed.   Constitutional:       General: He is not in acute distress.     Appearance: He is well-developed. He is obese. He is not diaphoretic.   HENT:      Head: Normocephalic and atraumatic.      Right Ear: External ear normal.      Left Ear: External ear normal.      Ears:      Comments: With hearing loss     Nose: Nose normal.   Eyes:      Conjunctiva/sclera: Conjunctivae normal.      Pupils: Pupils are equal, round, and reactive to light.   Neck:      Thyroid: No thyromegaly.   Cardiovascular:      Rate and Rhythm: Normal rate and regular rhythm.      Heart sounds: Normal heart sounds. No murmur heard.  No friction rub. No gallop.    Pulmonary:      Effort: Pulmonary effort is normal.      Breath sounds: Normal breath sounds. No wheezing.   Abdominal:      General: Bowel sounds are normal.      Palpations: Abdomen is soft.      Tenderness: There is no abdominal tenderness.   Musculoskeletal:         General: No tenderness or deformity. Normal range of motion.      Cervical back: Normal range of motion and neck supple.   Lymphadenopathy:      Cervical: No cervical adenopathy.   Skin:     General: Skin is warm and dry.      Capillary Refill: Capillary refill takes less than 2 seconds.      Findings: No rash.   Neurological:      Mental Status: He is alert and oriented to person, place, and time.      Cranial Nerves: No cranial nerve deficit.   Psychiatric:         Behavior: Behavior normal.         Thought Content: Thought content normal.         Judgment: Judgment normal.         Vitals:    11/24/21 1341   BP: 176/86   Pulse:    Temp:    SpO2:      Body mass index is 41.93 kg/m².    Hemoglobin A1C   Date Value Ref Range Status   09/29/2021 7.3 (H) 3.5 - 5.6 % Final   06/02/2021 8.0 (H) 3.5 - 5.6 % Final   03/02/2021 9.4 % Final   06/22/2020 7.7 % Final    10/30/2019 8.1 % Final     Glucose   Date Value Ref Range Status   03/02/2021 208 (A) 70 - 130 mg/dL Final   09/08/2020 302 (A) 70 - 130 mg/dL Final   08/25/2020 281 (A) 70 - 130 mg/dL Final     Comment:     breakfast of oats around 0700 this morning      LDL Cholesterol    Date Value Ref Range Status   03/02/2021 133 (H) 0 - 100 mg/dL Final   06/22/2020   Final     Comment:     Unable to calculate   06/22/2020 66 0 - 100 mg/dL Final   10/30/2019 72 0 - 100 mg/dL Final     TSH   Date Value Ref Range Status   03/02/2021 2.620 0.270 - 4.200 uIU/mL Final   06/22/2020 2.670 0.270 - 4.200 uIU/mL Final   10/30/2019 3.890 0.270 - 4.200 uIU/mL Final     Results for orders placed or performed in visit on 09/29/21   CBC Auto Differential    Specimen: Arm, Right; Blood   Result Value Ref Range    WBC 9.24 3.40 - 10.80 10*3/mm3    RBC 4.90 4.14 - 5.80 10*6/mm3    Hemoglobin 14.8 13.0 - 17.7 g/dL    Hematocrit 43.3 37.5 - 51.0 %    MCV 88.4 79.0 - 97.0 fL    MCH 30.2 26.6 - 33.0 pg    MCHC 34.2 31.5 - 35.7 g/dL    RDW 12.7 12.3 - 15.4 %    RDW-SD 40.5 37.0 - 54.0 fl    MPV 12.3 (H) 6.0 - 12.0 fL    Platelets 209 140 - 450 10*3/mm3    Neutrophil % 65.1 42.7 - 76.0 %    Lymphocyte % 23.3 19.6 - 45.3 %    Monocyte % 8.3 5.0 - 12.0 %    Eosinophil % 2.1 0.3 - 6.2 %    Basophil % 0.8 0.0 - 1.5 %    Immature Grans % 0.4 0.0 - 0.5 %    Neutrophils, Absolute 6.02 1.70 - 7.00 10*3/mm3    Lymphocytes, Absolute 2.15 0.70 - 3.10 10*3/mm3    Monocytes, Absolute 0.77 0.10 - 0.90 10*3/mm3    Eosinophils, Absolute 0.19 0.00 - 0.40 10*3/mm3    Basophils, Absolute 0.07 0.00 - 0.20 10*3/mm3    Immature Grans, Absolute 0.04 0.00 - 0.05 10*3/mm3    nRBC 0.0 0.0 - 0.2 /100 WBC   Uric acid    Specimen: Arm, Right; Blood   Result Value Ref Range    Uric Acid 4.3 3.4 - 7.0 mg/dL   Hemoglobin A1c    Specimen: Arm, Right; Blood   Result Value Ref Range    Hemoglobin A1C 7.3 (H) 3.5 - 5.6 %   Comprehensive metabolic panel    Specimen: Arm, Right; Blood    Result Value Ref Range    Glucose 256 (H) 65 - 99 mg/dL    BUN 35 (H) 6 - 20 mg/dL    Creatinine 2.09 (H) 0.76 - 1.27 mg/dL    Sodium 139 136 - 145 mmol/L    Potassium 5.0 3.5 - 5.2 mmol/L    Chloride 103 98 - 107 mmol/L    CO2 24.9 22.0 - 29.0 mmol/L    Calcium 9.3 8.6 - 10.5 mg/dL    Total Protein 7.4 6.0 - 8.5 g/dL    Albumin 4.50 3.50 - 5.20 g/dL    ALT (SGPT) 30 1 - 41 U/L    AST (SGOT) 22 1 - 40 U/L    Alkaline Phosphatase 80 39 - 117 U/L    Total Bilirubin 0.2 0.0 - 1.2 mg/dL    eGFR Non African Amer 34 (L) >60 mL/min/1.73    Globulin 2.9 gm/dL    A/G Ratio 1.6 g/dL    BUN/Creatinine Ratio 16.7 7.0 - 25.0    Anion Gap 11.1 5.0 - 15.0 mmol/L   Results for orders placed or performed in visit on 06/29/21   MicroAlbumin, Urine, Random - Urine, Clean Catch    Specimen: Urine, Clean Catch   Result Value Ref Range    Microalbumin, Urine 96.1 mg/dL   Uric acid    Specimen: Arm, Right; Blood   Result Value Ref Range    Uric Acid 3.0 (L) 3.4 - 7.0 mg/dL   Comprehensive metabolic panel    Specimen: Arm, Right; Blood   Result Value Ref Range    Glucose 85 65 - 99 mg/dL    BUN 25 (H) 6 - 20 mg/dL    Creatinine 1.93 (H) 0.76 - 1.27 mg/dL    Sodium 141 136 - 145 mmol/L    Potassium 4.6 3.5 - 5.2 mmol/L    Chloride 106 98 - 107 mmol/L    CO2 21.3 (L) 22.0 - 29.0 mmol/L    Calcium 9.3 8.6 - 10.5 mg/dL    Total Protein 7.3 6.0 - 8.5 g/dL    Albumin 4.50 3.50 - 5.20 g/dL    ALT (SGPT) 22 1 - 41 U/L    AST (SGOT) 24 1 - 40 U/L    Alkaline Phosphatase 84 39 - 117 U/L    Total Bilirubin 0.4 0.0 - 1.2 mg/dL    eGFR Non African Amer 37 (L) >60 mL/min/1.73    Globulin 2.8 gm/dL    A/G Ratio 1.6 g/dL    BUN/Creatinine Ratio 13.0 7.0 - 25.0    Anion Gap 13.7 5.0 - 15.0 mmol/L   Results for orders placed or performed during the hospital encounter of 06/02/21   CBC Auto Differential    Specimen: Arm, Left; Blood   Result Value Ref Range    WBC 5.48 3.40 - 10.80 10*3/mm3    RBC 4.53 4.14 - 5.80 10*6/mm3    Hemoglobin 13.3 13.0 - 17.7  g/dL    Hematocrit 38.7 37.5 - 51.0 %    MCV 85.4 79.0 - 97.0 fL    MCH 29.4 26.6 - 33.0 pg    MCHC 34.4 31.5 - 35.7 g/dL    RDW 14.1 12.3 - 15.4 %    RDW-SD 43.2 37.0 - 54.0 fl    MPV 12.5 (H) 6.0 - 12.0 fL    Platelets 135 (L) 140 - 450 10*3/mm3    Neutrophil % 72.4 42.7 - 76.0 %    Lymphocyte % 18.1 (L) 19.6 - 45.3 %    Monocyte % 9.1 5.0 - 12.0 %    Eosinophil % 0.0 (L) 0.3 - 6.2 %    Basophil % 0.2 0.0 - 1.5 %    Immature Grans % 0.2 0.0 - 0.5 %    Neutrophils, Absolute 3.97 1.70 - 7.00 10*3/mm3    Lymphocytes, Absolute 0.99 0.70 - 3.10 10*3/mm3    Monocytes, Absolute 0.50 0.10 - 0.90 10*3/mm3    Eosinophils, Absolute 0.00 0.00 - 0.40 10*3/mm3    Basophils, Absolute 0.01 0.00 - 0.20 10*3/mm3    Immature Grans, Absolute 0.01 0.00 - 0.05 10*3/mm3    nRBC 0.0 0.0 - 0.2 /100 WBC   D-dimer, Quantitative    Specimen: Blood   Result Value Ref Range    D-Dimer, Quantitative 0.42 0.00 - 0.59 mg/L (FEU)   Hemoglobin A1c    Specimen: Blood   Result Value Ref Range    Hemoglobin A1C 8.0 (H) 3.5 - 5.6 %   Comprehensive metabolic panel    Specimen: Blood   Result Value Ref Range    Glucose 175 (H) 65 - 99 mg/dL    BUN 31 (H) 6 - 20 mg/dL    Creatinine 1.92 (H) 0.76 - 1.27 mg/dL    Sodium 134 (L) 136 - 145 mmol/L    Potassium 4.1 3.5 - 5.2 mmol/L    Chloride 100 98 - 107 mmol/L    CO2 20.1 (L) 22.0 - 29.0 mmol/L    Calcium 8.5 (L) 8.6 - 10.5 mg/dL    Total Protein 7.3 6.0 - 8.5 g/dL    Albumin 3.90 3.50 - 5.20 g/dL    ALT (SGPT) 29 1 - 41 U/L    AST (SGOT) 41 (H) 1 - 40 U/L    Alkaline Phosphatase 73 39 - 117 U/L    Total Bilirubin 0.5 0.0 - 1.2 mg/dL    eGFR Non African Amer 37 (L) >60 mL/min/1.73    Globulin 3.4 gm/dL    A/G Ratio 1.1 g/dL    BUN/Creatinine Ratio 16.1 7.0 - 25.0    Anion Gap 13.9 5.0 - 15.0 mmol/L     *Note: Due to a large number of results and/or encounters for the requested time period, some results have not been displayed. A complete set of results can be found in Results Review.       Assessment/Plan    Diagnoses and all orders for this visit:    1. Acute on chronic alteration in mental status (Primary)  -     Cancel: Ambulatory Referral to Neurology  -     Ambulatory Referral to Neurology  -     EEG; Future  -     MRI Brain Without Contrast; Future    2. Type 2 diabetes mellitus with hyperglycemia, with long-term current use of insulin ([facility])    3. Primary hypertension    4. Stage 3b chronic kidney disease (HCC)    5. Bilateral hearing loss, unspecified hearing loss type    6. Grief reaction    Other orders  -     Semaglutide, 1 MG/DOSE, (Ozempic, 1 MG/DOSE,) 4 MG/3ML solution pen-injector; Inject 1 mg under the skin into the appropriate area as directed 1 (One) Time Per Week.  Dispense: 4 pen; Refill: 1  -     OLANZapine (ZyPREXA) 10 MG tablet; Take 1 tablet by mouth Every Night.  Dispense: 30 tablet; Refill: 1  -     Continuous Blood Gluc Sensor (FreeStyle Gerson 14 Day Sensor) misc; 1 tablet Daily.  Dispense: 1 each; Refill: 0    hold basaglar  Monitor blood glucose with Free style Gerson sensor  Increase ozempic to 1 mg weekly  Increase clonidine 0.2 to 2 in am, 1 in pm  Continue losartan and hydralazine  Continue tradjenta   Start Farxiga 5 mg daily  Increase zyprexa to 10 mg daily  Schedule MRI abd EEG  Refer to neurology

## 2021-12-23 DIAGNOSIS — R41.82 ACUTE ON CHRONIC ALTERATION IN MENTAL STATUS: ICD-10-CM

## 2021-12-28 ENCOUNTER — TELEPHONE (OUTPATIENT)
Dept: FAMILY MEDICINE CLINIC | Facility: CLINIC | Age: 51
End: 2021-12-28

## 2022-02-07 RX ORDER — ALLOPURINOL 100 MG/1
TABLET ORAL
Qty: 540 TABLET | Refills: 0 | OUTPATIENT
Start: 2022-02-07

## 2022-02-14 RX ORDER — HYDRALAZINE HYDROCHLORIDE 25 MG/1
TABLET, FILM COATED ORAL
Qty: 270 TABLET | Refills: 1 | OUTPATIENT
Start: 2022-02-14

## 2022-02-21 RX ORDER — CLONIDINE HYDROCHLORIDE 0.2 MG/1
TABLET ORAL
Qty: 180 TABLET | Refills: 1 | OUTPATIENT
Start: 2022-02-21

## 2022-02-27 DIAGNOSIS — E11.65 TYPE 2 DIABETES MELLITUS WITH HYPERGLYCEMIA, WITH LONG-TERM CURRENT USE OF INSULIN: ICD-10-CM

## 2022-02-27 DIAGNOSIS — Z79.4 TYPE 2 DIABETES MELLITUS WITH HYPERGLYCEMIA, WITH LONG-TERM CURRENT USE OF INSULIN: ICD-10-CM

## 2022-02-28 RX ORDER — LINAGLIPTIN 5 MG/1
TABLET, FILM COATED ORAL
Qty: 90 TABLET | Refills: 0 | OUTPATIENT
Start: 2022-02-28

## 2022-04-11 RX ORDER — VITAMIN B COMPLEX
TABLET ORAL
Qty: 30 TABLET | Refills: 5 | OUTPATIENT
Start: 2022-04-11

## 2022-04-13 RX ORDER — VITAMIN B COMPLEX
TABLET ORAL
Qty: 30 TABLET | Refills: 5 | OUTPATIENT
Start: 2022-04-13

## 2022-04-29 RX ORDER — AMLODIPINE BESYLATE 10 MG/1
TABLET ORAL
Qty: 90 TABLET | Refills: 1 | OUTPATIENT
Start: 2022-04-29

## 2022-05-02 RX ORDER — CLONIDINE HYDROCHLORIDE 0.2 MG/1
TABLET ORAL
Qty: 180 TABLET | Refills: 1 | OUTPATIENT
Start: 2022-05-02

## 2022-05-03 RX ORDER — OLANZAPINE 10 MG/1
TABLET ORAL
Qty: 30 TABLET | Refills: 1 | OUTPATIENT
Start: 2022-05-03

## 2022-05-16 RX ORDER — ROSUVASTATIN CALCIUM 10 MG/1
TABLET, COATED ORAL
Qty: 90 TABLET | Refills: 0 | OUTPATIENT
Start: 2022-05-16